# Patient Record
Sex: FEMALE | Race: ASIAN | NOT HISPANIC OR LATINO | Employment: FULL TIME | URBAN - METROPOLITAN AREA
[De-identification: names, ages, dates, MRNs, and addresses within clinical notes are randomized per-mention and may not be internally consistent; named-entity substitution may affect disease eponyms.]

---

## 2017-01-10 ENCOUNTER — PRENATAL OFFICE VISIT - HEALTHEAST (OUTPATIENT)
Dept: MIDWIFE SERVICES | Facility: CLINIC | Age: 32
End: 2017-01-10

## 2017-01-10 ENCOUNTER — COMMUNICATION - HEALTHEAST (OUTPATIENT)
Dept: ADMINISTRATIVE | Facility: CLINIC | Age: 32
End: 2017-01-10

## 2017-01-10 DIAGNOSIS — Z34.82 ENCOUNTER FOR SUPERVISION OF OTHER NORMAL PREGNANCY, SECOND TRIMESTER: ICD-10-CM

## 2017-01-10 DIAGNOSIS — N92.6 MISSED MENSES: ICD-10-CM

## 2017-01-10 DIAGNOSIS — O21.9 NAUSEA/VOMITING IN PREGNANCY: ICD-10-CM

## 2017-01-10 DIAGNOSIS — Z86.32 HISTORY OF GESTATIONAL DIABETES IN PRIOR PREGNANCY, CURRENTLY PREGNANT, FIRST TRIMESTER: ICD-10-CM

## 2017-01-10 DIAGNOSIS — O09.291 HISTORY OF GESTATIONAL DIABETES IN PRIOR PREGNANCY, CURRENTLY PREGNANT, FIRST TRIMESTER: ICD-10-CM

## 2017-01-10 ASSESSMENT — MIFFLIN-ST. JEOR: SCORE: 1240.19

## 2017-01-19 ENCOUNTER — RECORDS - HEALTHEAST (OUTPATIENT)
Dept: ADMINISTRATIVE | Facility: OTHER | Age: 32
End: 2017-01-19

## 2017-01-23 ENCOUNTER — AMBULATORY - HEALTHEAST (OUTPATIENT)
Dept: MIDWIFE SERVICES | Facility: CLINIC | Age: 32
End: 2017-01-23

## 2017-02-06 ENCOUNTER — AMBULATORY - HEALTHEAST (OUTPATIENT)
Dept: LAB | Facility: CLINIC | Age: 32
End: 2017-02-06

## 2017-02-06 DIAGNOSIS — O09.291 HISTORY OF GESTATIONAL DIABETES IN PRIOR PREGNANCY, CURRENTLY PREGNANT, FIRST TRIMESTER: ICD-10-CM

## 2017-02-06 DIAGNOSIS — Z34.82 ENCOUNTER FOR SUPERVISION OF OTHER NORMAL PREGNANCY, SECOND TRIMESTER: ICD-10-CM

## 2017-02-06 DIAGNOSIS — Z86.32 HISTORY OF GESTATIONAL DIABETES IN PRIOR PREGNANCY, CURRENTLY PREGNANT, FIRST TRIMESTER: ICD-10-CM

## 2017-02-07 ENCOUNTER — PRENATAL OFFICE VISIT - HEALTHEAST (OUTPATIENT)
Dept: MIDWIFE SERVICES | Facility: CLINIC | Age: 32
End: 2017-02-07

## 2017-02-07 DIAGNOSIS — O99.810 ABNORMAL MATERNAL GLUCOSE TOLERANCE, ANTEPARTUM: ICD-10-CM

## 2017-02-07 DIAGNOSIS — Z34.82 ENCOUNTER FOR SUPERVISION OF OTHER NORMAL PREGNANCY, SECOND TRIMESTER: ICD-10-CM

## 2017-02-07 LAB — SYPHILIS RPR SCREEN - HISTORICAL: NORMAL

## 2017-02-07 ASSESSMENT — MIFFLIN-ST. JEOR: SCORE: 1260.61

## 2017-02-21 ENCOUNTER — PRENATAL OFFICE VISIT - HEALTHEAST (OUTPATIENT)
Dept: MIDWIFE SERVICES | Facility: CLINIC | Age: 32
End: 2017-02-21

## 2017-02-21 DIAGNOSIS — Z23 NEED FOR TDAP VACCINATION: ICD-10-CM

## 2017-02-21 DIAGNOSIS — Z34.83 ENCOUNTER FOR SUPERVISION OF OTHER NORMAL PREGNANCY, THIRD TRIMESTER: ICD-10-CM

## 2017-02-21 ASSESSMENT — MIFFLIN-ST. JEOR: SCORE: 1265.14

## 2017-03-07 ENCOUNTER — PRENATAL OFFICE VISIT - HEALTHEAST (OUTPATIENT)
Dept: MIDWIFE SERVICES | Facility: CLINIC | Age: 32
End: 2017-03-07

## 2017-03-07 DIAGNOSIS — G56.00 PREGNANCY RELATED CARPAL TUNNEL SYNDROME, ANTEPARTUM: ICD-10-CM

## 2017-03-07 DIAGNOSIS — Z34.83 ENCOUNTER FOR SUPERVISION OF OTHER NORMAL PREGNANCY, THIRD TRIMESTER: ICD-10-CM

## 2017-03-07 DIAGNOSIS — N92.6 MISSED MENSES: ICD-10-CM

## 2017-03-07 DIAGNOSIS — O99.350 PREGNANCY RELATED CARPAL TUNNEL SYNDROME, ANTEPARTUM: ICD-10-CM

## 2017-03-07 ASSESSMENT — MIFFLIN-ST. JEOR: SCORE: 1274.21

## 2017-03-14 ENCOUNTER — OFFICE VISIT - HEALTHEAST (OUTPATIENT)
Dept: FAMILY MEDICINE | Facility: CLINIC | Age: 32
End: 2017-03-14

## 2017-03-14 DIAGNOSIS — J32.9 SINUSITIS: ICD-10-CM

## 2017-03-14 DIAGNOSIS — J02.9 SORETHROAT: ICD-10-CM

## 2017-03-15 ENCOUNTER — COMMUNICATION - HEALTHEAST (OUTPATIENT)
Dept: FAMILY MEDICINE | Facility: CLINIC | Age: 32
End: 2017-03-15

## 2017-03-21 ENCOUNTER — PRENATAL OFFICE VISIT - HEALTHEAST (OUTPATIENT)
Dept: MIDWIFE SERVICES | Facility: CLINIC | Age: 32
End: 2017-03-21

## 2017-03-21 DIAGNOSIS — Z60.3 LANGUAGE BARRIER: ICD-10-CM

## 2017-03-21 DIAGNOSIS — G56.00 PREGNANCY RELATED CARPAL TUNNEL SYNDROME, ANTEPARTUM: ICD-10-CM

## 2017-03-21 DIAGNOSIS — O99.350 PREGNANCY RELATED CARPAL TUNNEL SYNDROME, ANTEPARTUM: ICD-10-CM

## 2017-03-21 DIAGNOSIS — Z34.83 ENCOUNTER FOR SUPERVISION OF OTHER NORMAL PREGNANCY, THIRD TRIMESTER: ICD-10-CM

## 2017-03-21 DIAGNOSIS — Z75.8 LANGUAGE BARRIER: ICD-10-CM

## 2017-03-21 SDOH — SOCIAL STABILITY - SOCIAL INSECURITY: ACCULTURATION DIFFICULTY: Z60.3

## 2017-03-21 ASSESSMENT — MIFFLIN-ST. JEOR: SCORE: 1278.75

## 2017-04-04 ENCOUNTER — PRENATAL OFFICE VISIT - HEALTHEAST (OUTPATIENT)
Dept: MIDWIFE SERVICES | Facility: CLINIC | Age: 32
End: 2017-04-04

## 2017-04-04 DIAGNOSIS — N92.6 MISSED MENSES: ICD-10-CM

## 2017-04-04 DIAGNOSIS — Z34.83 ENCOUNTER FOR SUPERVISION OF OTHER NORMAL PREGNANCY, THIRD TRIMESTER: ICD-10-CM

## 2017-04-04 ASSESSMENT — MIFFLIN-ST. JEOR: SCORE: 1287.37

## 2017-04-11 ENCOUNTER — PRENATAL OFFICE VISIT - HEALTHEAST (OUTPATIENT)
Dept: MIDWIFE SERVICES | Facility: CLINIC | Age: 32
End: 2017-04-11

## 2017-04-11 DIAGNOSIS — Z34.83 ENCOUNTER FOR SUPERVISION OF OTHER NORMAL PREGNANCY, THIRD TRIMESTER: ICD-10-CM

## 2017-04-11 ASSESSMENT — MIFFLIN-ST. JEOR: SCORE: 1278.75

## 2017-04-18 ENCOUNTER — PRENATAL OFFICE VISIT - HEALTHEAST (OUTPATIENT)
Dept: MIDWIFE SERVICES | Facility: CLINIC | Age: 32
End: 2017-04-18

## 2017-04-18 ENCOUNTER — HOSPITAL ENCOUNTER (OUTPATIENT)
Dept: MEDSURG UNIT | Facility: CLINIC | Age: 32
Discharge: HOME | End: 2017-04-18
Attending: ADVANCED PRACTICE MIDWIFE | Admitting: ADVANCED PRACTICE MIDWIFE

## 2017-04-18 DIAGNOSIS — Z34.83 ENCOUNTER FOR SUPERVISION OF OTHER NORMAL PREGNANCY, THIRD TRIMESTER: ICD-10-CM

## 2017-04-18 DIAGNOSIS — N92.6 MISSED MENSES: ICD-10-CM

## 2017-04-18 ASSESSMENT — MIFFLIN-ST. JEOR: SCORE: 1282.83

## 2017-04-25 ENCOUNTER — PRENATAL OFFICE VISIT - HEALTHEAST (OUTPATIENT)
Dept: MIDWIFE SERVICES | Facility: CLINIC | Age: 32
End: 2017-04-25

## 2017-04-25 DIAGNOSIS — Z34.83 ENCOUNTER FOR SUPERVISION OF OTHER NORMAL PREGNANCY, THIRD TRIMESTER: ICD-10-CM

## 2017-04-25 ASSESSMENT — MIFFLIN-ST. JEOR: SCORE: 1287.82

## 2017-05-02 ENCOUNTER — PRENATAL OFFICE VISIT - HEALTHEAST (OUTPATIENT)
Dept: MIDWIFE SERVICES | Facility: CLINIC | Age: 32
End: 2017-05-02

## 2017-05-02 DIAGNOSIS — Z34.83 ENCOUNTER FOR SUPERVISION OF OTHER NORMAL PREGNANCY, THIRD TRIMESTER: ICD-10-CM

## 2017-05-02 ASSESSMENT — MIFFLIN-ST. JEOR: SCORE: 1296.89

## 2017-05-08 ENCOUNTER — AMBULATORY - HEALTHEAST (OUTPATIENT)
Dept: OBGYN | Facility: CLINIC | Age: 32
End: 2017-05-08

## 2017-05-08 ENCOUNTER — HOSPITAL ENCOUNTER (OUTPATIENT)
Dept: MEDSURG UNIT | Facility: CLINIC | Age: 32
Discharge: HOME | End: 2017-05-08
Attending: ADVANCED PRACTICE MIDWIFE | Admitting: ADVANCED PRACTICE MIDWIFE

## 2017-05-11 ENCOUNTER — HOME CARE/HOSPICE - HEALTHEAST (OUTPATIENT)
Dept: HOME HEALTH SERVICES | Facility: HOME HEALTH | Age: 32
End: 2017-05-11

## 2017-05-13 ENCOUNTER — HOME CARE/HOSPICE - HEALTHEAST (OUTPATIENT)
Dept: HOME HEALTH SERVICES | Facility: HOME HEALTH | Age: 32
End: 2017-05-13

## 2017-06-20 ENCOUNTER — OFFICE VISIT - HEALTHEAST (OUTPATIENT)
Dept: MIDWIFE SERVICES | Facility: CLINIC | Age: 32
End: 2017-06-20

## 2017-06-20 DIAGNOSIS — K59.00 CONSTIPATION: ICD-10-CM

## 2017-06-20 DIAGNOSIS — Z30.09 ENCOUNTER FOR OTHER GENERAL COUNSELING OR ADVICE ON CONTRACEPTION: ICD-10-CM

## 2017-06-20 DIAGNOSIS — N92.6 MISSED MENSES: ICD-10-CM

## 2017-06-20 DIAGNOSIS — Z34.83 ENCOUNTER FOR SUPERVISION OF OTHER NORMAL PREGNANCY, THIRD TRIMESTER: ICD-10-CM

## 2017-06-20 ASSESSMENT — MIFFLIN-ST. JEOR: SCORE: 1185.76

## 2017-07-25 ENCOUNTER — HOSPITAL ENCOUNTER (OUTPATIENT)
Dept: ULTRASOUND IMAGING | Facility: CLINIC | Age: 32
Discharge: HOME | End: 2017-07-25
Attending: ADVANCED PRACTICE MIDWIFE

## 2017-07-25 ENCOUNTER — OFFICE VISIT - HEALTHEAST (OUTPATIENT)
Dept: MIDWIFE SERVICES | Facility: CLINIC | Age: 32
End: 2017-07-25

## 2017-07-25 DIAGNOSIS — N93.9 VAGINAL BLEEDING, ABNORMAL: ICD-10-CM

## 2017-07-25 DIAGNOSIS — R10.2 PERINEAL PAIN IN FEMALE: ICD-10-CM

## 2017-07-25 ASSESSMENT — MIFFLIN-ST. JEOR: SCORE: 1178.96

## 2017-08-22 ENCOUNTER — OFFICE VISIT - HEALTHEAST (OUTPATIENT)
Dept: MIDWIFE SERVICES | Facility: CLINIC | Age: 32
End: 2017-08-22

## 2017-08-22 DIAGNOSIS — R10.2 PERINEAL PAIN IN FEMALE: ICD-10-CM

## 2017-08-22 DIAGNOSIS — K64.4 EXTERNAL HEMORRHOID: ICD-10-CM

## 2017-08-22 ASSESSMENT — MIFFLIN-ST. JEOR: SCORE: 1178.96

## 2017-09-12 ENCOUNTER — OFFICE VISIT - HEALTHEAST (OUTPATIENT)
Dept: MIDWIFE SERVICES | Facility: CLINIC | Age: 32
End: 2017-09-12

## 2017-09-12 DIAGNOSIS — Z30.42 ENCOUNTER FOR DEPO-PROVERA CONTRACEPTION: ICD-10-CM

## 2017-10-08 ENCOUNTER — RECORDS - HEALTHEAST (OUTPATIENT)
Dept: ADMINISTRATIVE | Facility: OTHER | Age: 32
End: 2017-10-08

## 2017-10-30 ENCOUNTER — AMBULATORY - HEALTHEAST (OUTPATIENT)
Dept: MIDWIFE SERVICES | Facility: CLINIC | Age: 32
End: 2017-10-30

## 2017-11-12 ENCOUNTER — OFFICE VISIT - HEALTHEAST (OUTPATIENT)
Dept: FAMILY MEDICINE | Facility: CLINIC | Age: 32
End: 2017-11-12

## 2017-11-12 DIAGNOSIS — R07.0 THROAT PAIN: ICD-10-CM

## 2017-11-12 DIAGNOSIS — R05.9 COUGH: ICD-10-CM

## 2017-11-12 DIAGNOSIS — R50.9 FEVER: ICD-10-CM

## 2017-11-27 ENCOUNTER — OFFICE VISIT - HEALTHEAST (OUTPATIENT)
Dept: FAMILY MEDICINE | Facility: CLINIC | Age: 32
End: 2017-11-27

## 2017-11-27 DIAGNOSIS — R51.9 HEADACHE: ICD-10-CM

## 2017-12-05 ENCOUNTER — OFFICE VISIT - HEALTHEAST (OUTPATIENT)
Dept: MIDWIFE SERVICES | Facility: CLINIC | Age: 32
End: 2017-12-05

## 2017-12-05 DIAGNOSIS — Z30.42 ON DEPO-PROVERA FOR CONTRACEPTION: ICD-10-CM

## 2017-12-05 DIAGNOSIS — N92.0 MENORRHAGIA: ICD-10-CM

## 2017-12-05 DIAGNOSIS — Z30.42 SURVEILLANCE FOR DEPO-PROVERA CONTRACEPTION: ICD-10-CM

## 2017-12-05 ASSESSMENT — MIFFLIN-ST. JEOR: SCORE: 1181.23

## 2017-12-19 ENCOUNTER — AMBULATORY - HEALTHEAST (OUTPATIENT)
Dept: PHYSICAL MEDICINE AND REHAB | Facility: CLINIC | Age: 32
End: 2017-12-19

## 2017-12-19 ENCOUNTER — OFFICE VISIT - HEALTHEAST (OUTPATIENT)
Dept: FAMILY MEDICINE | Facility: CLINIC | Age: 32
End: 2017-12-19

## 2017-12-19 DIAGNOSIS — R20.2 NUMBNESS AND TINGLING OF RIGHT HAND: ICD-10-CM

## 2017-12-19 DIAGNOSIS — R20.2 PARESTHESIA OF HAND: ICD-10-CM

## 2017-12-19 DIAGNOSIS — R20.0 NUMBNESS AND TINGLING OF RIGHT HAND: ICD-10-CM

## 2017-12-19 LAB — HBA1C MFR BLD: 5.2 % (ref 3.5–6.1)

## 2017-12-20 ENCOUNTER — COMMUNICATION - HEALTHEAST (OUTPATIENT)
Dept: FAMILY MEDICINE | Facility: CLINIC | Age: 32
End: 2017-12-20

## 2018-01-17 ENCOUNTER — HOSPITAL ENCOUNTER (OUTPATIENT)
Dept: PHYSICAL MEDICINE AND REHAB | Facility: CLINIC | Age: 33
Discharge: HOME | End: 2018-01-17
Attending: PHYSICAL MEDICINE & REHABILITATION

## 2018-01-17 DIAGNOSIS — R20.2 RIGHT HAND PARESTHESIA: ICD-10-CM

## 2018-02-05 ENCOUNTER — COMMUNICATION - HEALTHEAST (OUTPATIENT)
Dept: FAMILY MEDICINE | Facility: CLINIC | Age: 33
End: 2018-02-05

## 2018-02-13 ENCOUNTER — OFFICE VISIT - HEALTHEAST (OUTPATIENT)
Dept: FAMILY MEDICINE | Facility: CLINIC | Age: 33
End: 2018-02-13

## 2018-02-13 DIAGNOSIS — G56.01 CARPAL TUNNEL SYNDROME ON RIGHT: ICD-10-CM

## 2018-02-20 ENCOUNTER — RECORDS - HEALTHEAST (OUTPATIENT)
Dept: ADMINISTRATIVE | Facility: OTHER | Age: 33
End: 2018-02-20

## 2018-02-26 ENCOUNTER — OFFICE VISIT - HEALTHEAST (OUTPATIENT)
Dept: MIDWIFE SERVICES | Facility: CLINIC | Age: 33
End: 2018-02-26

## 2018-02-26 DIAGNOSIS — Z30.9 CONTRACEPTIVE MANAGEMENT: ICD-10-CM

## 2018-05-21 ENCOUNTER — OFFICE VISIT - HEALTHEAST (OUTPATIENT)
Dept: MIDWIFE SERVICES | Facility: CLINIC | Age: 33
End: 2018-05-21

## 2018-05-21 DIAGNOSIS — Z30.09 ENCOUNTER FOR OTHER GENERAL COUNSELING OR ADVICE ON CONTRACEPTION: ICD-10-CM

## 2018-05-22 ENCOUNTER — RECORDS - HEALTHEAST (OUTPATIENT)
Dept: ADMINISTRATIVE | Facility: OTHER | Age: 33
End: 2018-05-22

## 2018-08-06 ENCOUNTER — OFFICE VISIT - HEALTHEAST (OUTPATIENT)
Dept: MIDWIFE SERVICES | Facility: CLINIC | Age: 33
End: 2018-08-06

## 2018-08-06 DIAGNOSIS — Z30.013 ENCOUNTER FOR INITIAL PRESCRIPTION OF INJECTABLE CONTRACEPTIVE: ICD-10-CM

## 2018-10-01 ENCOUNTER — OFFICE VISIT - HEALTHEAST (OUTPATIENT)
Dept: MIDWIFE SERVICES | Facility: CLINIC | Age: 33
End: 2018-10-01

## 2018-10-01 DIAGNOSIS — Z30.09 CONTRACEPTIVE EDUCATION: ICD-10-CM

## 2018-10-01 ASSESSMENT — MIFFLIN-ST. JEOR: SCORE: 1162.18

## 2019-01-28 ENCOUNTER — OFFICE VISIT - HEALTHEAST (OUTPATIENT)
Dept: FAMILY MEDICINE | Facility: CLINIC | Age: 34
End: 2019-01-28

## 2019-01-28 DIAGNOSIS — Z00.00 ROUTINE GENERAL MEDICAL EXAMINATION AT A HEALTH CARE FACILITY: ICD-10-CM

## 2019-01-28 DIAGNOSIS — Z30.9 ENCOUNTER FOR CONTRACEPTIVE MANAGEMENT, UNSPECIFIED TYPE: ICD-10-CM

## 2019-01-28 DIAGNOSIS — M25.511 CHRONIC PAIN OF BOTH SHOULDERS: ICD-10-CM

## 2019-01-28 DIAGNOSIS — G89.29 CHRONIC PAIN OF BOTH SHOULDERS: ICD-10-CM

## 2019-01-28 DIAGNOSIS — M25.512 CHRONIC PAIN OF BOTH SHOULDERS: ICD-10-CM

## 2019-01-28 DIAGNOSIS — M54.2 NECK PAIN: ICD-10-CM

## 2019-01-28 ASSESSMENT — MIFFLIN-ST. JEOR: SCORE: 1199.09

## 2019-02-14 ENCOUNTER — OFFICE VISIT - HEALTHEAST (OUTPATIENT)
Dept: PHYSICAL THERAPY | Facility: REHABILITATION | Age: 34
End: 2019-02-14

## 2019-02-14 DIAGNOSIS — G89.29 CHRONIC PAIN OF BOTH SHOULDERS: ICD-10-CM

## 2019-02-14 DIAGNOSIS — R29.898 DECREASED RANGE OF MOTION OF NECK: ICD-10-CM

## 2019-02-14 DIAGNOSIS — M54.2 CHRONIC NECK PAIN: ICD-10-CM

## 2019-02-14 DIAGNOSIS — M25.511 CHRONIC PAIN OF BOTH SHOULDERS: ICD-10-CM

## 2019-02-14 DIAGNOSIS — M25.512 CHRONIC PAIN OF BOTH SHOULDERS: ICD-10-CM

## 2019-02-14 DIAGNOSIS — G89.29 CHRONIC NECK PAIN: ICD-10-CM

## 2019-02-14 DIAGNOSIS — M25.619 DECREASED RANGE OF MOTION OF SHOULDER, UNSPECIFIED LATERALITY: ICD-10-CM

## 2019-02-14 DIAGNOSIS — M62.81 GENERALIZED MUSCLE WEAKNESS: ICD-10-CM

## 2019-02-14 DIAGNOSIS — R20.0 BILATERAL HAND NUMBNESS: ICD-10-CM

## 2019-02-18 ENCOUNTER — OFFICE VISIT - HEALTHEAST (OUTPATIENT)
Dept: FAMILY MEDICINE | Facility: CLINIC | Age: 34
End: 2019-02-18

## 2019-02-18 DIAGNOSIS — Z30.017 NEXPLANON INSERTION: ICD-10-CM

## 2019-02-18 LAB
HCG UR QL: NEGATIVE
SP GR UR STRIP: 1.01 (ref 1–1.03)

## 2019-02-21 ENCOUNTER — OFFICE VISIT - HEALTHEAST (OUTPATIENT)
Dept: PHYSICAL THERAPY | Facility: REHABILITATION | Age: 34
End: 2019-02-21

## 2019-02-21 DIAGNOSIS — M25.511 CHRONIC PAIN OF BOTH SHOULDERS: ICD-10-CM

## 2019-02-21 DIAGNOSIS — M62.81 GENERALIZED MUSCLE WEAKNESS: ICD-10-CM

## 2019-02-21 DIAGNOSIS — M54.2 CHRONIC NECK PAIN: ICD-10-CM

## 2019-02-21 DIAGNOSIS — R29.898 DECREASED RANGE OF MOTION OF NECK: ICD-10-CM

## 2019-02-21 DIAGNOSIS — M25.619 DECREASED RANGE OF MOTION OF SHOULDER, UNSPECIFIED LATERALITY: ICD-10-CM

## 2019-02-21 DIAGNOSIS — R20.0 BILATERAL HAND NUMBNESS: ICD-10-CM

## 2019-02-21 DIAGNOSIS — M25.512 CHRONIC PAIN OF BOTH SHOULDERS: ICD-10-CM

## 2019-02-21 DIAGNOSIS — G89.29 CHRONIC NECK PAIN: ICD-10-CM

## 2019-02-21 DIAGNOSIS — G89.29 CHRONIC PAIN OF BOTH SHOULDERS: ICD-10-CM

## 2019-02-28 ENCOUNTER — OFFICE VISIT - HEALTHEAST (OUTPATIENT)
Dept: PHYSICAL THERAPY | Facility: REHABILITATION | Age: 34
End: 2019-02-28

## 2019-02-28 DIAGNOSIS — M54.2 CHRONIC NECK PAIN: ICD-10-CM

## 2019-02-28 DIAGNOSIS — M62.81 GENERALIZED MUSCLE WEAKNESS: ICD-10-CM

## 2019-02-28 DIAGNOSIS — M25.512 CHRONIC PAIN OF BOTH SHOULDERS: ICD-10-CM

## 2019-02-28 DIAGNOSIS — G89.29 CHRONIC NECK PAIN: ICD-10-CM

## 2019-02-28 DIAGNOSIS — G89.29 CHRONIC PAIN OF BOTH SHOULDERS: ICD-10-CM

## 2019-02-28 DIAGNOSIS — R29.898 DECREASED RANGE OF MOTION OF NECK: ICD-10-CM

## 2019-02-28 DIAGNOSIS — M25.511 CHRONIC PAIN OF BOTH SHOULDERS: ICD-10-CM

## 2019-02-28 DIAGNOSIS — R20.0 BILATERAL HAND NUMBNESS: ICD-10-CM

## 2019-02-28 DIAGNOSIS — M25.619 DECREASED RANGE OF MOTION OF SHOULDER, UNSPECIFIED LATERALITY: ICD-10-CM

## 2019-03-07 ENCOUNTER — OFFICE VISIT - HEALTHEAST (OUTPATIENT)
Dept: PHYSICAL THERAPY | Facility: REHABILITATION | Age: 34
End: 2019-03-07

## 2019-03-07 DIAGNOSIS — R29.898 DECREASED RANGE OF MOTION OF NECK: ICD-10-CM

## 2019-03-07 DIAGNOSIS — M62.81 GENERALIZED MUSCLE WEAKNESS: ICD-10-CM

## 2019-03-07 DIAGNOSIS — R20.0 BILATERAL HAND NUMBNESS: ICD-10-CM

## 2019-03-07 DIAGNOSIS — M54.2 CHRONIC NECK PAIN: ICD-10-CM

## 2019-03-07 DIAGNOSIS — G89.29 CHRONIC PAIN OF BOTH SHOULDERS: ICD-10-CM

## 2019-03-07 DIAGNOSIS — M25.512 CHRONIC PAIN OF BOTH SHOULDERS: ICD-10-CM

## 2019-03-07 DIAGNOSIS — M25.511 CHRONIC PAIN OF BOTH SHOULDERS: ICD-10-CM

## 2019-03-07 DIAGNOSIS — M25.619 DECREASED RANGE OF MOTION OF SHOULDER, UNSPECIFIED LATERALITY: ICD-10-CM

## 2019-03-07 DIAGNOSIS — G89.29 CHRONIC NECK PAIN: ICD-10-CM

## 2019-03-14 ENCOUNTER — OFFICE VISIT - HEALTHEAST (OUTPATIENT)
Dept: PHYSICAL THERAPY | Facility: REHABILITATION | Age: 34
End: 2019-03-14

## 2019-03-14 DIAGNOSIS — R20.0 BILATERAL HAND NUMBNESS: ICD-10-CM

## 2019-03-14 DIAGNOSIS — M54.2 CHRONIC NECK PAIN: ICD-10-CM

## 2019-03-14 DIAGNOSIS — R29.898 DECREASED RANGE OF MOTION OF NECK: ICD-10-CM

## 2019-03-14 DIAGNOSIS — M25.619 DECREASED RANGE OF MOTION OF SHOULDER, UNSPECIFIED LATERALITY: ICD-10-CM

## 2019-03-14 DIAGNOSIS — G89.29 CHRONIC NECK PAIN: ICD-10-CM

## 2019-03-14 DIAGNOSIS — M62.81 GENERALIZED MUSCLE WEAKNESS: ICD-10-CM

## 2019-03-14 DIAGNOSIS — G89.29 CHRONIC PAIN OF BOTH SHOULDERS: ICD-10-CM

## 2019-03-14 DIAGNOSIS — M25.512 CHRONIC PAIN OF BOTH SHOULDERS: ICD-10-CM

## 2019-03-14 DIAGNOSIS — M25.511 CHRONIC PAIN OF BOTH SHOULDERS: ICD-10-CM

## 2019-03-28 ENCOUNTER — OFFICE VISIT - HEALTHEAST (OUTPATIENT)
Dept: PHYSICAL THERAPY | Facility: REHABILITATION | Age: 34
End: 2019-03-28

## 2019-03-28 DIAGNOSIS — G89.29 CHRONIC PAIN OF BOTH SHOULDERS: ICD-10-CM

## 2019-03-28 DIAGNOSIS — M62.81 GENERALIZED MUSCLE WEAKNESS: ICD-10-CM

## 2019-03-28 DIAGNOSIS — M54.2 CHRONIC NECK PAIN: ICD-10-CM

## 2019-03-28 DIAGNOSIS — R20.0 BILATERAL HAND NUMBNESS: ICD-10-CM

## 2019-03-28 DIAGNOSIS — R29.898 DECREASED RANGE OF MOTION OF NECK: ICD-10-CM

## 2019-03-28 DIAGNOSIS — G89.29 CHRONIC NECK PAIN: ICD-10-CM

## 2019-03-28 DIAGNOSIS — M25.619 DECREASED RANGE OF MOTION OF SHOULDER, UNSPECIFIED LATERALITY: ICD-10-CM

## 2019-03-28 DIAGNOSIS — M25.512 CHRONIC PAIN OF BOTH SHOULDERS: ICD-10-CM

## 2019-03-28 DIAGNOSIS — M25.511 CHRONIC PAIN OF BOTH SHOULDERS: ICD-10-CM

## 2019-04-18 ENCOUNTER — OFFICE VISIT - HEALTHEAST (OUTPATIENT)
Dept: PHYSICAL THERAPY | Facility: REHABILITATION | Age: 34
End: 2019-04-18

## 2019-04-18 DIAGNOSIS — R20.0 BILATERAL HAND NUMBNESS: ICD-10-CM

## 2019-04-18 DIAGNOSIS — G89.29 CHRONIC NECK PAIN: ICD-10-CM

## 2019-04-18 DIAGNOSIS — G89.29 CHRONIC PAIN OF BOTH SHOULDERS: ICD-10-CM

## 2019-04-18 DIAGNOSIS — M54.2 CHRONIC NECK PAIN: ICD-10-CM

## 2019-04-18 DIAGNOSIS — M25.511 CHRONIC PAIN OF BOTH SHOULDERS: ICD-10-CM

## 2019-04-18 DIAGNOSIS — M62.81 GENERALIZED MUSCLE WEAKNESS: ICD-10-CM

## 2019-04-18 DIAGNOSIS — R29.898 DECREASED RANGE OF MOTION OF NECK: ICD-10-CM

## 2019-04-18 DIAGNOSIS — M25.512 CHRONIC PAIN OF BOTH SHOULDERS: ICD-10-CM

## 2019-04-18 DIAGNOSIS — M25.619 DECREASED RANGE OF MOTION OF SHOULDER, UNSPECIFIED LATERALITY: ICD-10-CM

## 2020-01-29 ENCOUNTER — COMMUNICATION - HEALTHEAST (OUTPATIENT)
Dept: FAMILY MEDICINE | Facility: CLINIC | Age: 35
End: 2020-01-29

## 2020-12-21 ENCOUNTER — OFFICE VISIT - HEALTHEAST (OUTPATIENT)
Dept: FAMILY MEDICINE | Facility: CLINIC | Age: 35
End: 2020-12-21

## 2020-12-21 DIAGNOSIS — H66.90 ACUTE OTITIS MEDIA, UNSPECIFIED OTITIS MEDIA TYPE: ICD-10-CM

## 2021-01-13 ENCOUNTER — OFFICE VISIT - HEALTHEAST (OUTPATIENT)
Dept: FAMILY MEDICINE | Facility: CLINIC | Age: 36
End: 2021-01-13

## 2021-01-13 DIAGNOSIS — J01.00 ACUTE NON-RECURRENT MAXILLARY SINUSITIS: ICD-10-CM

## 2021-03-16 ENCOUNTER — OFFICE VISIT - HEALTHEAST (OUTPATIENT)
Dept: FAMILY MEDICINE | Facility: CLINIC | Age: 36
End: 2021-03-16

## 2021-03-16 DIAGNOSIS — Z00.00 ROUTINE GENERAL MEDICAL EXAMINATION AT A HEALTH CARE FACILITY: ICD-10-CM

## 2021-03-16 DIAGNOSIS — R68.89 COLD INTOLERANCE: ICD-10-CM

## 2021-03-16 LAB
CHOLEST SERPL-MCNC: 158 MG/DL
ERYTHROCYTE [DISTWIDTH] IN BLOOD BY AUTOMATED COUNT: 12.6 % (ref 11–14.5)
FASTING STATUS PATIENT QL REPORTED: YES
FASTING STATUS PATIENT QL REPORTED: YES
GLUCOSE BLD-MCNC: 93 MG/DL (ref 70–99)
HCT VFR BLD AUTO: 44.4 % (ref 35–47)
HDLC SERPL-MCNC: 45 MG/DL
HGB BLD-MCNC: 15 G/DL (ref 12–16)
LDLC SERPL CALC-MCNC: 101 MG/DL
MCH RBC QN AUTO: 29.7 PG (ref 27–34)
MCHC RBC AUTO-ENTMCNC: 33.8 G/DL (ref 32–36)
MCV RBC AUTO: 88 FL (ref 80–100)
PLATELET # BLD AUTO: 203 THOU/UL (ref 140–440)
PMV BLD AUTO: 11.6 FL (ref 7–10)
RBC # BLD AUTO: 5.05 MILL/UL (ref 3.8–5.4)
T3FREE SERPL-MCNC: 3.2 PG/ML (ref 1.9–3.9)
T4 FREE SERPL-MCNC: 1 NG/DL (ref 0.7–1.8)
TRIGL SERPL-MCNC: 62 MG/DL
TSH SERPL DL<=0.005 MIU/L-ACNC: 1.03 UIU/ML (ref 0.3–5)
WBC: 7 THOU/UL (ref 4–11)

## 2021-03-16 RX ORDER — ETONOGESTREL 68 MG/1
1 IMPLANT SUBCUTANEOUS ONCE
Status: SHIPPED | COMMUNITY
Start: 2021-02-18 | End: 2022-01-10

## 2021-03-16 ASSESSMENT — MIFFLIN-ST. JEOR: SCORE: 1210.71

## 2021-03-17 ENCOUNTER — COMMUNICATION - HEALTHEAST (OUTPATIENT)
Dept: FAMILY MEDICINE | Facility: CLINIC | Age: 36
End: 2021-03-17

## 2021-05-27 NOTE — PROGRESS NOTES
[facility] Daily Progress     Patient Name: Dayana Mazariegos  Date: 2019  Visit #:   Referring Provider: Darya Torres*  Referring Diagnosis:   Neck pain [M54.2]       Chronic pain of both shoulders [M25.511, G89.29, M25.512]          Visit Diagnosis:     ICD-10-CM    1. Chronic neck pain M54.2     G89.29    2. Chronic pain of both shoulders M25.511     G89.29     M25.512    3. Generalized muscle weakness M62.81    4. Decreased range of motion of shoulder, unspecified laterality M25.619    5. Decreased range of motion of neck R29.898    6. Bilateral hand numbness R20.0        Assessment:     Pt reports neck and shoulders feel great with mild occasional B hand tingling remaining.    Patient is benefitting from skilled physical therapy and is making steady progress toward functional goals.  Patient is appropriate to continue with skilled physical therapy intervention, as indicated by initial plan of care.    Goal Status:  Pt. will demonstrate/verbalize independence in self-management of condition in : 12 weeks - MET    Pt. will have improved quality of sleep: with less pain;waking less times/night;in 12 weeks -MET    Patient Turn Head: for driving;for conversation;with less pain;with less difficulty;in 12 weeks - MET    Patient will reach / maintain arm movement: overhead;with less pain;with less difficulty;in 12 weeks - MET    Plan / Patient Education:     Pt demo's I with HEP and has met goals. Will D/C to I with HEP.  Thank you for this referral!   Pt to contact PT if any questions/concerns arise.    Subjective:     Pain Ratin/10 for neck pain today - pt reports neck pain and headaches are mostly gone but B hand numbness can remain.     Pt also reports that she hurt her back when she did upright rows with the green band so she D/C'ed that exercise.    Pt very pleased with her progress.    Objective:     ROM - B shoulder flexion 180   without pain (was 135 with pain )  Neck ROM WNL  without pain (was min to mod with pain)    Thoracic ROM WNL without back pain     Strength - B shoulder flexors 5/5 and abd 5/5 (was 3+/5 with pain)    FROM LAST VISIT  Neck strength  CCFT - pt can reach 4-12 mmHg change and hold x10 seconds x10 reps (unable to do this initially)    Treatment Today      TREATMENT MINUTES COMMENTS   Evaluation     Self-care/ Home management     Manual therapy     Neuromuscular Re-education     Therapeutic Activity     Therapeutic Exercises 26 Reassessed neck, thoracic and B shoulder ROM and strength and discussed results. Performed, added to and finalized HEP per pt instructions and printed for home.    Gait training     Modality__________________                Total 26    Blank areas are intentional and mean the treatment did not include these items.       Gissell Ruggiero PT, DPT, OCS, CLT  4/18/2019  11:37 AM

## 2021-05-27 NOTE — PROGRESS NOTES
[facility] Daily Progress     Patient Name: Dayana Mazariegos  Date: 3/28/2019  Visit #:   Referring Provider: Darya Torres*  Referring Diagnosis:   Neck pain [M54.2]       Chronic pain of both shoulders [M25.511, G89.29, M25.512]          Visit Diagnosis:     ICD-10-CM    1. Chronic neck pain M54.2     G89.29    2. Chronic pain of both shoulders M25.511     G89.29     M25.512    3. Generalized muscle weakness M62.81    4. Decreased range of motion of shoulder, unspecified laterality M25.619    5. Decreased range of motion of neck R29.898    6. Bilateral hand numbness R20.0        Assessment:     Pt reports minimal pain sx remaining and demo's great improvement to neck and B shoulder ROM and strength.    Patient is benefitting from skilled physical therapy and is making steady progress toward functional goals.  Patient is appropriate to continue with skilled physical therapy intervention, as indicated by initial plan of care.    Goal Status:  Pt. will demonstrate/verbalize independence in self-management of condition in : 12 weeks - IMPROVING    Pt. will have improved quality of sleep: with less pain;waking less times/night;in 12 weeks - IMPROVING    Patient Turn Head: for driving;for conversation;with less pain;with less difficulty;in 12 weeks - IMPROVING    Patient will reach / maintain arm movement: overhead;with less pain;with less difficulty;in 12 weeks - IMPROVING    Plan / Patient Education:     Continue with initial plan of care.  Reassess sx in 3 weeks - attempt counter push ups if able.    Subjective:     Pain Ratin/10 - feeling really good with HEP.   Pain can increase slightly after the exercises - like it worked hard - but feels better later.   Headaches are gone and pt is sleeping better.    Objective:     ROM - B shoulder flexion 180   without pain (was 135 with pain )  Neck ROM WNL without pain (was min to mod with pain)    Strength - B shoulder flexors 4+/5 and abd 5/5 (was  3+/5 with pain)    Neck strength  CCFT - pt can reach 4-12 mmHg change and hold x10 seconds x10 reps (unable to do this initially)    Treatment Today      TREATMENT MINUTES COMMENTS   Evaluation     Self-care/ Home management     Manual therapy     Neuromuscular Re-education     Therapeutic Activity     Therapeutic Exercises 24 Reassessed neck and B shoulder ROM and strength and discussed results. Performed and added to HEP per pt instructions and printed for home.    Gait training     Modality__________________                Total 24    Blank areas are intentional and mean the treatment did not include these items.       Gissell Ruggiero PT, DPT, OCS, CLT  3/28/2019  11:40 AM

## 2021-05-30 VITALS — HEIGHT: 62 IN | WEIGHT: 143 LBS | BODY MASS INDEX: 26.31 KG/M2

## 2021-05-30 VITALS — BODY MASS INDEX: 25.58 KG/M2 | WEIGHT: 139 LBS | HEIGHT: 62 IN

## 2021-05-30 VITALS — WEIGHT: 139 LBS | HEIGHT: 62 IN | BODY MASS INDEX: 25.58 KG/M2

## 2021-05-30 VITALS — WEIGHT: 140.9 LBS | BODY MASS INDEX: 25.93 KG/M2 | HEIGHT: 62 IN

## 2021-05-30 VITALS — BODY MASS INDEX: 25.95 KG/M2 | WEIGHT: 141 LBS | HEIGHT: 62 IN

## 2021-05-30 VITALS — BODY MASS INDEX: 24.84 KG/M2 | WEIGHT: 135 LBS | HEIGHT: 62 IN

## 2021-05-30 VITALS — HEIGHT: 62 IN | WEIGHT: 139.9 LBS | BODY MASS INDEX: 25.75 KG/M2

## 2021-05-30 VITALS — WEIGHT: 130.5 LBS | BODY MASS INDEX: 24.01 KG/M2 | HEIGHT: 62 IN

## 2021-05-30 VITALS — HEIGHT: 62 IN | BODY MASS INDEX: 25.4 KG/M2 | WEIGHT: 138 LBS

## 2021-05-30 VITALS — BODY MASS INDEX: 25.03 KG/M2 | WEIGHT: 136 LBS | HEIGHT: 62 IN

## 2021-05-30 VITALS — WEIGHT: 139.5 LBS | BODY MASS INDEX: 25.51 KG/M2

## 2021-05-31 VITALS — HEIGHT: 62 IN | BODY MASS INDEX: 21.53 KG/M2 | WEIGHT: 117 LBS

## 2021-05-31 VITALS — WEIGHT: 120.25 LBS | BODY MASS INDEX: 22.72 KG/M2

## 2021-05-31 VITALS — BODY MASS INDEX: 22.48 KG/M2 | WEIGHT: 119 LBS

## 2021-05-31 VITALS — HEIGHT: 61 IN | BODY MASS INDEX: 22.84 KG/M2 | WEIGHT: 121 LBS

## 2021-05-31 VITALS — BODY MASS INDEX: 22.76 KG/M2 | WEIGHT: 120.44 LBS

## 2021-05-31 VITALS — WEIGHT: 118.5 LBS | BODY MASS INDEX: 21.81 KG/M2 | HEIGHT: 62 IN

## 2021-06-01 VITALS — WEIGHT: 120.31 LBS | BODY MASS INDEX: 22.73 KG/M2

## 2021-06-02 VITALS — BODY MASS INDEX: 22.35 KG/M2 | HEIGHT: 62 IN | WEIGHT: 121.44 LBS

## 2021-06-02 VITALS — BODY MASS INDEX: 22.13 KG/M2 | WEIGHT: 121 LBS

## 2021-06-02 VITALS — HEIGHT: 61 IN | BODY MASS INDEX: 22.05 KG/M2 | WEIGHT: 116.8 LBS

## 2021-06-05 VITALS
DIASTOLIC BLOOD PRESSURE: 64 MMHG | SYSTOLIC BLOOD PRESSURE: 98 MMHG | BODY MASS INDEX: 22.82 KG/M2 | HEART RATE: 74 BPM | WEIGHT: 124 LBS | OXYGEN SATURATION: 96 % | HEIGHT: 62 IN

## 2021-06-05 NOTE — TELEPHONE ENCOUNTER
Spoke to  and left a message for patient to call back.  She may take 800mg with food three times a day for 10 days.  If this is not helping, then to call back

## 2021-06-05 NOTE — TELEPHONE ENCOUNTER
Patient presented to the  of the clinic and stated she currently has the Nexplanon and has been bleeding since before [place]. Patient declined on scheduling an appointment. Patient is requesting a medication be submitted to the pharmacy to stop the bleeding. Please advise.  Thank you, Jane Choudhury

## 2021-06-08 NOTE — PROGRESS NOTES
Dayana Mazariegos is here with  for a routine prenatal visit at 27w0d.  She has c/o cold symptoms x2 weeks with cough.  Getting better - doing warm honey drinks.  Reviewed and gave handouts on colds in pregnancy and safety of Cat B medications before Cat C.  No fevers, headaches, or green discharge.  Mild RUQ pain from coughing.  Discussed walk-in care if symptoms worsen, but supportive care appropriate at this time.  She is feeling fetal movement regularly.  Reviewed u/s from [facility] - no further f-up needed. Fetal maturity and expectations for fetal movement in the third trimester, how and when to do fetal kick counts and when to call CNM discussed. Appetite is normal. Interval weight gain is appropriate.  28 week labs (1 hour GTT, Hgb & RPR) done yesterday since Dayana did an early 1 hour GCT and failed and had to do the 3 hour early.  Passed 3 hour yesterday!  Very happy not to have GDM.  Risks and benefits of TdaP during pregnancy at 27-36 weeks discussed and desires at a future visit.   Advised scheduling visits every 2-3 weeks until ~36 weeks. Anticipatory guidance, warning signs (with emphasis on  labor signs and symptoms), when to call and CNM contact information reviewed.

## 2021-06-08 NOTE — PROGRESS NOTES
Dayana Mazariegos is here with professional  for a routine prenatal visit at 23w0d.  She has no concerns and is feeling well.  Has LII U/S scheduled with MPP (due to EIF of fetal heart) - was unsure of location.  Gave her MPP number for her and  to call and confirm together.  She is feeling fetal movement regularly.  S>D today - will have MPP U/S soon.  Appetite is normal - though c/o nausea in the mornings - still taking B6/Unisom. Interval weight gain is appropriate.  Discussed 28 week labs/screening for gestational diabetes, anemia and RPR are recommended at her next visit.  Discussed doing 3 hour GTT since patient did early 1 hour GCT and did not pass.  Patient agrees.  So, 3 hour GTT, RPR and Hgb ordered as future - instructed on scheduling. Handouts given on glucose testing and TdaP during pregnancy.  Anticipatory guidance, warning signs, when to call and CNM contact information reviewed.

## 2021-06-09 NOTE — PROGRESS NOTES
Dayana here today with professional  for routine prenatal visit. Baby very active.Having leg cramps in right calf at night, discussed comfort measures and OTC supplements that may help to alleviate symptoms. Received Tdap vaccine today.

## 2021-06-09 NOTE — PROGRESS NOTES
Dayana is here with .  Active baby.  Wants Rx for Calcium to relieve leg cramps - would rather have Rx.  She is also noticing right wrist/hand pain - worse at night, sometimes she drops things.  Discussed carpal tunnel in pregnancy - reassured of normalcy, discussed ways to alleviate.  Also offered OT consult to advise for wrist splints.  Patient declines at this time.  Does not plan circ - other boys are not circumcised.  She is still hoping this provider will be at her birth.  Reviewed the group practice and call shifts for safety for her and myself.  Did note that my partners can call me, but I cannot guarantee that I will be at her birth.  Explained that all of the CNMs in the practice are very kind and caring and will take great care of her in labor.

## 2021-06-09 NOTE — PROGRESS NOTES
Dayana Mazariegos is here with  for a routine prenatal visit at 35w0d.  She has c/o feeling uncomfortable when she walks - more pressure and active baby - discussed normal sensations at end of pregnancy.    Fetal movement is normal. Interval weight gain is approriate.  Discussed how to obtain a breast pump and she will check with insurance.  EPDS = 0 today.  No concerns about PPD; identifies a good support system.  Reviewed Group B strep vaginal & rectal culture and hemoglobin at one of her upcoming visits between 35-37 weeks. Encouraged to schedule weekly visits from ~36 weeks to/through her EDB.   Anticipatory guidance, warning signs (with emphasis on  labor signs and symptoms), when to call and CNM contact information reviewed.  Needs help with pre-registration - will do at next visit.

## 2021-06-09 NOTE — PROGRESS NOTES
31-year-old female who is 32 weeks gestation presents with acute sinusitis.  I will give her amoxicillin.  I recommend that she minimize her nose blowing as this may create trauma and cause nosebleed.  Patient will continue to monitor her symptoms.  If she is not better in 1-2 weeks and to come back.  She verbalized understanding and agreed with the plan      ASSESSMENT/PLAN:  1. Sorethroat  - Rapid Strep A Screen-Throat  - Group A Strep, RNA Direct Detection, Throat    2. Sinusitis  - amoxicillin (AMOXIL) 500 MG capsule; Take 1 capsule (500 mg total) by mouth 2 (two) times a day for 10 days.  Dispense: 20 capsule; Refill: 0      Orders Placed This Encounter   Procedures     Rapid Strep A Screen-Throat     Group A Strep, RNA Direct Detection, Throat       CHIEF COMPLAINT:  Chief Complaint   Patient presents with     nasal discharge     green discharge x 1 week. Pregnat 32 weeks     Cough     x 1 week productive cough     Sore Throat       HISTORY OF PRESENT ILLNESS:  Dayana is a 31 y.o. female presenting to the clinic today for a URI. She is 32 weeks gestation with baby number 3. She has a cough and some rhinorrhea with congestion. Her cough and congestion are worse at night time. She notes some blood in her nasal discharge. She has been ill for one week. Her cough is productive with some green sputum. She does have a bit of a sore throat, and her voice is hoarse. Her rapid strep today is negative.     REVIEW OF SYSTEMS:   No fevers. No shortness of breath. All other systems are negative.    PFSH:  No new history.     TOBACCO USE:  History   Smoking Status     Never Smoker   Smokeless Tobacco     Never Used       VITALS:  Vitals:    03/14/17 1009   BP: 90/52   Patient Site: Left Arm   Patient Position: Sitting   Cuff Size: Adult Regular   Pulse: 80   Temp: 97.9  F (36.6  C)   TempSrc: Oral   Weight: 139 lb 8 oz (63.3 kg)     Wt Readings from Last 3 Encounters:   03/14/17 139 lb 8 oz (63.3 kg)   03/07/17 138 lb (62.6  kg)   02/21/17 136 lb (61.7 kg)       PHYSICAL EXAM:  Constitutional: Patient is oriented to person, place, and time. Patient appears well-developed and well-nourished. No distress.   Head: Normocephalic and atraumatic.   Right Ear: External ear normal. Slight erythema of TM.   Left Ear: External ear normal. Slight erythema of TM.   Nose: Nose normal.   Mouth/Throat: Slight peritonsillar erythema. No oropharyngeal exudate.   Eyes: Conjunctivae and EOM are normal. Pupils are equal, round, and reactive to light. Right eye exhibits no discharge. Left eye exhibits no discharge. No scleral icterus.   Cardiovascular: Normal rate, regular rhythm, normal heart sounds and intact distal pulses. No murmur heard.   Pulmonary/Chest: Effort normal and breath sounds normal. No stridor. No respiratory distress. Patient has no wheezes, no rales, exhibits no tenderness.       Results for orders placed or performed in visit on 03/14/17   Rapid Strep A Screen-Throat   Result Value Ref Range    Rapid Strep A Antigen No Group A Strep detected No Group A Strep detected         ADDITIONAL HISTORY SUMMARIZED (2): None.  DECISION TO OBTAIN EXTRA INFORMATION (1): None.   RADIOLOGY TESTS (1): None.  LABS (1): Ordered and reviewed labs today.  MEDICINE TESTS (1): None.  INDEPENDENT REVIEW (2 each): None.     The visit lasted a total of 6 minutes face to face with the patient. Over 50% of the time was spent counseling and educating the patient about sinusitis care.    INeris, am scribing for and in the presence of, Dr. Connor.    IDr. Connor, personally performed the services described in this documentation, as scribed by Neris Mora in my presence, and it is both accurate and complete.    MEDICATIONS:  Current Outpatient Prescriptions   Medication Sig Dispense Refill     calcium-vitamin D (CALCIUM-VITAMIN D) 500 mg(1,250mg) -200 unit per tablet Take 1-2 tablets by mouth daily as needed. 60 tablet 3     prenatal vit  #105-iron-FA-dha 30 mg iron- 1.4 mg-300 mg Cmpk Take 1 tablet by mouth daily. 30 each 9     pyridoxine, vitamin B6, (VITAMIN B-6) 50 MG tablet Take 1 tablet (50 mg total) by mouth 2 (two) times a day. 60 tablet 3     amoxicillin (AMOXIL) 500 MG capsule Take 1 capsule (500 mg total) by mouth 2 (two) times a day for 10 days. 20 capsule 0     diphenhydrAMINE HCl (UNISOM SLEEPMELTS) 25 mg TbDL Take 1 tablet by mouth bedtime. 30 each 3     No current facility-administered medications for this visit.        Total data points: 1

## 2021-06-09 NOTE — PROGRESS NOTES
Dayana is doing well.  She is here today with an .  She was prescribed amoxicillin for sinusitis and is feeling better.  We did discuss taking the full dose of medication.  She is feeling numbness and tingling in her right wrist and feeling tightness in her right thigh.  She does have a wrist splint from her second pregnancy at home and has not used it yet.  We did recommend that she try using the splint at night as she had concerns that she would not be able to use her hands during the day.  She also explains that if she gets up and walks that her leg will be less tight and will feel better.  She is unsure of her birth plan at this time.  She did have an epidural with her first pregnancy and her second labor went very quickly and was told that she did not have enough time for an epidural but had a lot of pain.  We did discuss that if there was time and if she wanted an epidural we would certainly get her medication as she wanted during her labor.  We discussed that at her next visit the CSS would help her preregister for the hospital online as she states she is unsure how to do that and does not think her  can do that either.  She is interested in Depo-Provera for postpartum contraception, the phone nubmer for billing ws given to her to find out about cost of this contraceptive method.  We did discuss risks benefits and alternatives to this method.  She does not want anything implantable and does not feel that she will take a pill regularly.  She is feeling active fetal movements denies contractions leaking of fluid or bleeding.  She also expressed interest in information about [facility] then and the number for the [facility] WIC office was given to her.

## 2021-06-10 NOTE — PROGRESS NOTES
"Dayana is here with  for routine PNV at 39 weeks.  She has questions about episiotomies.  She states she had one with both of her other two children and is wondering if it is best - she kept saying how it \"helped\" her baby's head come out.  I discussed the research and evidence on episiotomies and that we do not routinely do them.  Discussed most common scenario for doing one is for low FHTs and head not likely to deliver without episiotomy.  She is also worried about constipation after delivery.  She states with her last baby, she didn't have a BM until 1 week after the birth.  I discussed being proactive about stool softeners after birth and we will discuss before she goes home.  Good fetal movement.  No s/sx of labor.  Wrote down CNM number to call for labor and explained process of answering service, etc.  On Leopold's, fetal head ballotable above pelvis.    "

## 2021-06-10 NOTE — PROGRESS NOTES
Pt arrived into triage with C/O of bloody discharge v. SROM.  EFM placed and CNM updated.  Plan to do a Rom Plus per order and call CNM Hopes with results.

## 2021-06-10 NOTE — PROGRESS NOTES
"  Outpatient/Triage Note:     Patient Name: Dayana Mazariegos  :     1985  MRN:    [id number]        Assessment:   @ 39w6d  Irregular painless contractions, not in labor  Membranes intact  GBS positive     Plan:   - Discharge to home undelivered. Reviewed signs and symptoms of labor as well as  warning signs including decreased fetal movement, leaking of fluid, and vaginal bleeding. Reviewed how to contact on-call CNM. Follow-up in clinic with CNM as scheduled or sooner as needed. All questions answered and patient is in agreement with the plan.         Subjective:  Dayana Mazariegos is a 31 y.o.  at 39w 6d, with an EDC of 17 who presented to [facility] for evaluation of possible SROM. A [place]  was present via [employer], however the patient asked that her  interpret and that the [employer]  be shut off. Dayana reports she had \"a few drops of blood in the toilet\" this morning as well as a lot of mucus.  Discussed the characteristics of ROM and to call the CNM on call if she feels she has watery discharge.  Reassurance provided regarding the normalcy of increased mucus discharge and pink tinged or pink streaks in vaginal discharge.         Objective:  Vital signs: LMP 2016  FHR: 130 with moderate variability and no decelerations  Uterine contractions: q 4-6 min., 60-80 seconds     Abdomen: Vertex by Leopold's, abdomen non-tender  SVE: Deferred  Legs: No edema, +2DTR, clonus absent        Results:  ROM + : negative     Provider:NATE Salcido CNM        Total time with patient 5 mn >50% time spent counseling                            "
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
## 2021-06-10 NOTE — PROGRESS NOTES
"Discharged to home.  Pt verbalizes understanding discharge instructions.  Pt does speak limited amounts of English.  We did use the language line and pt states she \"never wants to use again\".  Her significant other is who she designates to interpret for her.  Pt has an appointment to she Nusrat LEMUS tomorrow.  Not feeling contractions.  Feels comfortable going home.   "

## 2021-06-10 NOTE — PROGRESS NOTES
Dayana Mazariegos is here with her  and  for a routine prenatal visit at 38w0d.  Dayana and her  had more questions about Group B strep.  Explained in detail what GBS is, treatment, and recommendations for baby after.  Gave handout.  Reassurance provided that it was nothing that Dayana did wrong - that 1 out of 5 women test positive for it.  Pre-registration is done since she had a triage visit last week.  Fetal movement is normal. Interval weight gain is approriate.  Encouraged to schedule weekly visits from to/through her EDB.   Anticipatory guidance, signs of symptoms of labor or SROM, third trimester warning signs, when to call and CNM contact information reviewed.

## 2021-06-10 NOTE — PROGRESS NOTES
Dayana Mazariegos is here by herself for a routine prenatal visit at 36w0d.  She has no concerns and is feeling well.  Fetal movement is normal. Interval weight gain is down 1 lb - appetite is normal.  Weight gain normal overall. Group B strep and hemoglobin discussed and obtained today.  VE:  3cm ex os, closed internal os/40/-2/soft/posterior, vertex palpated.  Encouraged to schedule weekly visits to/through her EDB.   Anticipatory guidance, signs of symptoms of labor or SROM, third trimester warning signs, when to call and CNM contact information reviewed.

## 2021-06-10 NOTE — PROGRESS NOTES
"Outpatient/Triage Note:    Patient Name:  Dayana Mazariegos  :      1985  MRN:      [id number]      Assessment:   @ 39w6d here for evaluation of SROM  GBS positive    Plan:   ROM + to evaluate possible SROM  Continuous fetal monitoring x 20 minutes, if category 1 then ok to be off monitor    Subjective:  Dayana Mazariegos is a 31 y.o.  at 39w 6d, with an EDC of 17 who presented to [facility] for evaluation of possible SROM. A [place]  was present via [employer], however the patient asked that her  interpret and that the [employer]  be shut off.  Dayana reports she had \"a few drops of blood in the toilet\" this morning as well as a lot of mucus. She denies watery discharge and/or needing to wear a pad due to constant moisture.  THe RN told me she was feeling contractions when she initially arrived to triage.  She denied contractions when I saw her.          Objective:  Vital signs: LMP 2016  FHR: 130 with moderate variability and no decelerations  Uterine contractions: q 2-7 min., 60-80 seconds    Abdomen: Vertex by Leopold's, abdomen non-tender  SVE: Deferred  Legs: No edema, +2DTR, clonus absent      Results:  Pending    Provider:NATE Salcido CNM      Total time with patient  10 mn >50% time spent counseling.          "
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
## 2021-06-10 NOTE — PROGRESS NOTES
"Outpatient/Triage Note:    Patient Name:  Dayana Mazariegos  :      1985  MRN:      [id number]    Assessment:   @ 37w0d   S/P mild abdominal trauma    Plan:   -Follow up with CNM at scheduled next week  - Discharge to home undelivered. Reviewed warning signs including decreased fetal movement, leaking of fluid, vaginal bleeding, or signs of labor. Reviewed how to contact on-call CNM. Follow-up in clinic with CNM as scheduled or sooner as needed. All questions answered. Agrees with plan.     Subjective  Dayana Mazariegos is a 31 y.o.  who presented to [facility] for evaluation of mild abdominal trauma yesterday.  [employer]  used for communication with RN at bedside.  Reports that she ran into a table on her LRQ.  It was painful for 3-4 hours but feels better today.  Reports increased FM yesterday and normal today.  Not feeling any increase in uterine activity.  Was seen in clinic this AM and offered eval at [facility].  Denies leaking of fluid, bleeding, or changes in fetal movement.  Blood type is positive.  Does not feel like she is in labor with the ctx she is having.  Pt desires discharge to home.    Objective  Temp:  [98.3  F (36.8  C)] 98.3  F (36.8  C)  Heart Rate:  [75-83] 79  Resp:  [14] 14  BP: ()/(60-69) 105/69    Physical Exam:  General appearance:  comfortable  Psych: AAO x3  Skin: Pink, warm & dry  HEENT: unremarkable  Cardiovascular:  Normal BP, reports no SOB  Respiratory:  Normal effort for breathing  Abdomen: soft, NT, gravid  Leopolds: Vertex         EFW:<4500 grams (AGA)     FHR:Baseline: 140 bpm, Variability: Moderate (6 - 25 bpm), Accelerations: \"present and Decelerations: Absent    Uterine contractions:Occasional with some irritability Duration: 20-90 seconds and Intensity: mild    SVE:deferred    Extremeties: WNL edema  none     Total time spent with patient:10 minutes, >50% spent on counseling and coordination of care.    Provider:  Tonya Velez CNM, " ALLAN SULLIVAN    Date:  4/18/2017  Time:  10:55 AM

## 2021-06-11 NOTE — PROGRESS NOTES
MIGUEL@Patient ID: Dayana Mazariegos is a 31 y.o. female.  Assessment:   Normal postpartum exam at ~6 weeks postpartum.   lactating    Plan:    1. Pap smear not done at today's visit. Due for annual Gyn exam in June 2018   2. Desired contraception: Depo-Provera injections.  Initially she stated she would wait a while to do the Depo-Provera injection, but then when I discussed the risk of waiting she decided to do it today due to her concern of an unplanned pregnancy.  Also discussed the possibility of an IUD and she expressed some interest in it.  We will continue to discuss further at future visits.  3. Discussed resumption of exercise and setting a goal to return to pre-pregnancy weight in the next 6-12 months.   4.  Resumption of intercourse reviewed with possible changes in libido and vaginal lubrication while nursing.  5.  Nutrition and supplements reviewed.  Advised continuation of a prenatal or multivitamin, also Vitamin D3 5000 IU geltab daily and an omega 3 fatty acid supplement.  I provided refills to her pharmacy of the prenatal vitamin and calcium.  I also prescribed one months worth of stool softeners, but discussed if stools continue to be hard she should consider follow-up with primary care physician.  6. Adjustment to parenting, self care and open communication with her support system discussed. Warning signs and symptoms related to postpartum mood disorders reviewed.   7.  Also recommended sitz baths for small hemorrhoid, and last little bit of healing for scant vaginal suture noted.  I reassured her of normal perineal and labial healing.    Total time spent with patient 45 minutes, >50% counseling, education and coordination of care.    Subjective:     Dayana Mazariegos is a 31 y.o. female who presents for postpartum visit. She is 6 weeks postpartum following a NSVB.  I have fully reviewed the prenatal and intrapartum course. The delivery was at Term and 40.0 weeks gestation Her baby boy is named  "Moisse and weighed 7 lbs 7 oz at birth.     Postpartum course has been stable. Baby's course has been stable. Baby is feeding breast and pumped breastmilk.  She is worried because her baby has not had a stool in 4 days and has tummy feels bigger.  I discussed that if he is exclusively  it can be normal for baby's to go up to a week to have a BM when exclusively . However, I encouraged her to discuss her concerns with the pediatrician and make an appointment with them to discuss her concerns.  Lochia ceased at 5 1/2 weeks postpartum.  Bowel function is normal - was constipated for the first week after birth.  She now has a BM every day, but feels like them are hard every time.  Notices some bleeding after bowel movements.  She is drinking lots of water, fruits/vegetable intact is good. Bladder function is normal.  She is also worried about sutures that she can still feel vaginally and she feels like 1 of her labia is lower than the other labia.  She is worried about getting a \"disease\" due to this difference.  I reassured her that changes after birth due to cosmetic reasons are not concerning for getting a disease.  She has not resumed intercourse. Desired contraception: Depo-Provera injections. [place] postpartum depression screening score: 2. She has not resumed regular exercise.  Patient stays at home with her children.      Review of Systems -see HPI  General:  Denies problem  Eyes: Denies problem  Ears/Nose/Throat: Denies problem  Cardiovascular: Denies problem  Respiratory:  Denies problem  Gastrointestinal:  Denies problem, Genitourinary: Denies problem  Musculoskeletal:  Denies problem  Skin: Denies problem  Neurologic: Denies problem  Psychiatric: Denies Problem  Endocrine: Denies problem    Objective:         Physical Exam:  General Appearance: Alert, cooperative, no distress, appears stated age  Skin: Skin color, texture, turgor normal, no rashes or lesions  Throat: Lips, mucosa, and " tongue normal; teeth and gums normal  HEENT: grossly normal; otoscopic and opthalmic exam not performed.   Neck: Supple, symmetrical, trachea midline, no adenopathy;  thyroid: not enlarged, symmetric, no tenderness/mass/nodules  Lungs: Clear to auscultation bilaterally, respirations unlabored  Breasts: No breast masses, tenderness, asymmetry, or nipple discharge.  Heart: Regular rate and rhythm, S1 and S2 normal, no murmur, rub, or gallop  Abdomen: Soft, non-tender.   Pelvic:External genitalia normal without lesions or irritation. Vagina and cervix show no lesions, inflammation, discharge or tenderness. 2nd degree perineal laceration well approximated and well healed scant, almost dissolved ~3mm of suture noted vaginally.  Right labial laceration well healed.  Small 1cm by 3mm hemorrhoid noted - soft and pink.   No cystocele, No rectocele. Uterus fully involuted.  No adnexal mass or tenderness.  Extremities: Extremities normal without varicosities or edema       Last Pap: 6/18/2016. Results were: normal  Immunization History   Administered Date(s) Administered     Influenza,seasonal quad, PF, 36+MOS 11/15/2016     Tdap 02/21/2017     Immunization status: up to date and documented, stated as current, but no records available

## 2021-06-12 NOTE — PROGRESS NOTES
"Assessment:     31 y.o.  Hemorrhoid  Cyst at vaginal introitus, painful     Plan:     -Increase oral fluids, fiber rich foods, OTC prep-H cream, daily ativity (waling or other form of exercise) to decrease constipation/hemorroihdal discomfort.  -Referral was previously placed to [facility] for patient regarding perineal pain and cyst. Encouraged pt to make apt with OBGYN to assess for need for I&D of painful cyst. Pt has phone number to call.    Subjective:       Dayana Mazariegos is a 31 y.o. female who presents for evaluation of hemorrhoid. Reports itching and pain at hemorrhoid. Sometimes notes bleeding when straining if constipated.  Also c/o cyst in vagina that has not resolved with tub soaks. States she overal feels much better since visit on 7/25/17 after being diagnosed with infection at the site of perineal repair following vaginal delivery. She was TX'd with oral Keflex 500 mg BID x 10 days. She also had a pelvic US done that was normal (no signs of retained POC) and a negative wet prep. She was encouraged to f/u with [facility]. Pt denies signs of vaginal itching, odor, discharge, bleeding, fever, flu-like sxs. Notes occasional discomfort at site of cyst.    Visit completed with in-person .    Review of Systems  Pertinent items are noted in HPI.      Objective:     BP 94/58 (Patient Site: Right Arm, Patient Position: Sitting, Cuff Size: Adult Regular)  Pulse 60  Resp 16  Ht 5' 2\" (1.575 m)  Wt 117 lb (53.1 kg)  BMI 21.4 kg/m2      General appearance: alert, appears stated age, cooperative and no distress  Pelvic: SSE not performed. External genitalia normal, small 0.5 x 0.5 cm cyst noted near hymenal ring/at 7'o clock. Another smaller nodule noted adjacent to this. Consistent with findings of exam on 7/25. Applied mild and then moderate pressure to cyst without any ability to expel contents. Pt reports pain with palpation. Vaginal-rectal septum intact. Small 1cm by 3mm hemorrhoid noted - soft " and pink. No sign of thrombosis or bleeding.   Extremities: extremities normal, atraumatic, no cyanosis or edema     Total time spent with patient: 20 minutes, >50% time spent counseling and coordination of care.    NATE Gonzalez,CNM

## 2021-06-12 NOTE — PROGRESS NOTES
Assessment:     2 months postpartum with persistent perineal pain, vaginal bleeding and itching  Infection at site of perineal laceration     Plan:   -Discussed exam findings and assessment with patient and  on speaker phone.   acted as .  -Ultrasound to evaluate persistent vaginal bleeding  - Rx for antibiotics: Cephalexin 500mg BID X 10 days.    -Symptom relief reviewed and encouraged daily tub soaks, avoiding intercourse at this time and wearing cotton underwear.    -Referral to [facility].Enc to call for an appointment if pain is unresolved in the next 2-3 days after initiating antibiotic treatment.   -Case reviewed with Dr. Quintero, who is in agreement with the above plan.  TT with patient 25mns >50% time spent in counseling or coordination of care.     Subjective:   No  available, patient called  who was put on speaker phone and acted as an . Dayana Mazariegos is a 31 y.o. female who presents for evaluation of vaginal and perineal pain.  She had a postpartum visit approximately a month ago and had her laceration assessed at that time.  She states CNM noted suture strings were visible on the perineum but no signs of infection.  Patient was encouraged to do daily tub soaks.  She has not done these regularly and in the last several weeks she has noticed perineal pain and itching.  She also notes that she has had persistent vaginal bleeding since birth.  It is usually very light and she only has to wear a liner though sometimes it is slightly heavier.  She does not think her period has returned since the birth.  She did get a double shot at her postpartum visit.  She has not resumed intercourse since the birth due to discomfort in her vagina.    She otherwise feels well, denies fever, chills, abdominal tenderness.    Review of Systems  Pertinent items are noted in HPI.      Objective:   /58 (Patient Site: Left Arm, Patient Position: Sitting, Cuff Size: Adult  "Regular)  Pulse 68  Temp 98.2  F (36.8  C) (Oral)   Ht 5' 2\" (1.575 m)  Wt 117 lb (53.1 kg)  Breastfeeding? No  BMI 21.4 kg/m2    Physical Exam:  General: Pleasant, articulate, well-groomed, well-nourished female.  Not in any apparent distress.  Skin: no lesions or rashes.  Abdomen: Soft, nontender, no masses, negative CVAT.  Pelvic:  External genitalia: Normal hair distribution, no lesions.  Urethral opening: Without lesions, or tenderness.   Bladder: Without masses, or tenderness.  Perineum: Intact laceration, small amount of scar tissue visible.  Vagina: Small 1 cm cystlike structure noted underneath the skin on patient's right.  Slight erythema of introitus.  2 small nodules, potentially pus filled noted 1 cm inside the vaginal opening on patient's right.  Rugated, normal-appearing discharge with scant blood noted throughout vagina. Wet prep obtained.  Cervix: parous, pink, smooth, no lesions.    Bimanual: Uterus small, mobile, nontender, no masses.  No CMT.  Adnexa, without masses or tenderness.    Lower extremities: +1 reflexes, no significant edema.         "

## 2021-06-13 NOTE — PROGRESS NOTES
Pt was seen on 10/8/17 by Dr Sadia Ricketts to evaluate and treat a 2 cm vaginal cyst. An I&D was performed in clinic and she was scheduled for follow up visit in 2 weeks time.  Transcribed from consult letter 10/30/17  Didi Rivera, NATE,ALLAN

## 2021-06-13 NOTE — PROGRESS NOTES
"Dayana Mazariegos is a 35 y.o. female who is being evaluated via a billable telephone visit.      The patient has been notified of following:     \"This telephone visit will be conducted via a call between you and your physician/provider. We have found that certain health care needs can be provided without the need for a physical exam.  This service lets us provide the care you need with a short phone conversation.  If a prescription is necessary we can send it directly to your pharmacy.  If lab work is needed we can place an order for that and you can then stop by our lab to have the test done at a later time.    Telephone visits are billed at different rates depending on your insurance coverage. During this emergency period, for some insurers they may be billed the same as an in-person visit.  Please reach out to your insurance provider with any questions.    If during the course of the call the physician/provider feels a telephone visit is not appropriate, you will not be charged for this service.\"    Patient has given verbal consent to a Telephone visit? Yes      Patient would like to receive their AVS by mail    Additional provider notes:   Dayana Mazariegos 35 y.o. female presented for telephone encounter.    Chief Complaint   Patient presents with     Headache     x few days     Ear Pain        Patient Active Problem List   Diagnosis   (none) - all problems resolved or deleted      Current Outpatient Medications on File Prior to Visit   Medication Sig Dispense Refill     calcium-vitamin D (CALCIUM-VITAMIN D) 500 mg(1,250mg) -200 unit per tablet Take 1-2 tablets by mouth daily as needed. 60 tablet 4     ibuprofen (ADVIL,MOTRIN) 600 MG tablet Take 1 tablet (600 mg total) by mouth every 6 (six) hours as needed for pain. 28 tablet 0     prenatal vit,estrada 74-iron-folic 27 mg iron- 1 mg Tab Take 1 tablet by mouth daily. 90 tablet 4     No current facility-administered medications on file prior to visit.       Past Surgical " History:   Procedure Laterality Date     INCISION AND DRAINAGE OF WOUND  10/2017    vaginal cyst I&D at [facility]     Family History   Problem Relation Age of Onset     Breast cancer Mother      Early death Mother      No Medical Problems Father      No Medical Problems Sister      No Medical Problems Brother        S:    Used [employer] phone  but was unsuccessful  I tried again and spoke to patient & her   Feeling hot/cold, chills, pain in both ears, headache  Symptoms noted 5 days  No fever    O:  Patient sound alert, coherent, and not in distress  Discussed diagnostic limitations    Assessment/Plan:  1. Acute otitis media, unspecified otitis media type  - amoxicillin-clavulanate (AUGMENTIN) 875-125 mg per tablet; Take 1 tablet by mouth 2 (two) times a day for 10 days.  Dispense: 20 tablet; Refill: 0  Suspect otitis media  F/u in person if not better after 1-2wks      Phone call duration:  21 minutes    Darya Edwards MD

## 2021-06-14 NOTE — PROGRESS NOTES
"Assessment/Plan:        Diagnoses and all orders for this visit:    Menorrhagia - long menstrual cycle  -     ibuprofen (ADVIL,MOTRIN) 800 MG tablet; Take 1 tablet (800 mg total) by mouth every 8 (eight) hours for 15 doses.  Dispense: 15 tablet; Refill: 0  -     Hemoglobin    On Depo-Provera for contraception    Surveillance for Depo-Provera contraception  -     ibuprofen (ADVIL,MOTRIN) 800 MG tablet; Take 1 tablet (800 mg total) by mouth every 8 (eight) hours for 15 doses.  Dispense: 15 tablet; Refill: 0  -     medroxyPROGESTERone injection 150 mg (DEPO-PROVERA); Inject 1 mL (150 mg total) into the shoulder, thigh, or buttocks once.    Tchg:  Discussed normalcy of bleeding irregularities for women on Depo-Provera and that 70% of women experience these in the first year.  Discussed normal uterine size along with no blood/bleeding noted with exam (Dayana states \"I cleaned myself before I came.\").  Discussed other option of birth control and she would very much like to stay with Depo-Provera because she only has to come back q 3 mths.    Discussed using Ibuprofen Rx x5 days as noted above to decrease bleeding.   Reassured of normal cyst healing.  If Hgb abnormal, will consider more f-up.  Reviewed warning signs and when to call.      NATE Aguayo CNM  12/5/2017  2:02 PM    Total time spent with patient 20 minutes, >50% counseling, education and coordination of care.            Subjective:    Patient ID: Dayana Mazariegos is a 32 y.o. female. Here for c/o bleeding x14 days - this was her first period since she stopped breastfeeding her baby at 4 months postpartum.  She is now almost 7 months postpartum.  She states that she is changing a pad x3-4 hours.  She is concerned about the length of time of bleeding.  Notices mild menstrual cramps.  No clots.  She does like the Depo-Provera and would like to continue with that method.      Of note, Dayana had a vaginal cyst removal at [facility] in October, she did not see " Dr. Ricketts for follow up on that.      HPI    The following portions of the patient's history were reviewed and updated as appropriate: allergies, current medications, past family history, past medical history, past social history, past surgical history and problem list.    Review of Systems   Constitutional: Positive for fatigue. Negative for chills and fever.   Cardiovascular: Negative.    Gastrointestinal: Negative.    Endocrine: Negative.    Genitourinary: Positive for menstrual problem. Negative for decreased urine volume, dyspareunia, dysuria, enuresis, flank pain, frequency, genital sores, hematuria, pelvic pain, urgency, vaginal discharge and vaginal pain.   Neurological: Negative.    Hematological: Negative.              Objective:    Physical Exam   Constitutional: She is oriented to person, place, and time. She appears well-developed and well-nourished.   Eyes: Pupils are equal, round, and reactive to light.   Pulmonary/Chest: Effort normal.   Abdominal: Soft.   Genitourinary: Vagina normal and uterus normal. No vaginal discharge found.   Genitourinary Comments: Visually, area of I&D appears healed.  No blood noted externally.  Bimanual reveals normal sized uterus, no spotting/discharge/or blood on glove.     Musculoskeletal: Normal range of motion.   Neurological: She is alert and oriented to person, place, and time.   Skin: Skin is warm and dry.   Psychiatric: She has a normal mood and affect. Her behavior is normal. Judgment and thought content normal.

## 2021-06-14 NOTE — PROGRESS NOTES
Assessment:     1. Cough  azithromycin (ZITHROMAX) 250 MG tablet    benzonatate (TESSALON) 200 MG capsule   2. Fever  Influenza A/B Rapid Test   3. Throat pain  Rapid Strep A Screen-Throat    Group A Strep, RNA Direct Detection, Throat          Plan:     Given the duration of her symptoms continued fever and feeling like she is getting worse, I did elect to give her a prescription for azithromycin.  Negative for strep and influenza.  Recommend pushing fluids and follow-up if her symptoms are getting worse or not continuing to improve.    Subjective:       32 y.o. female presents for evaluation of a one-week history of cough and fever.  She is also had a headache and sore throat.  Her T-max has been 102.  She is continued to have a fever now for a week and her temperature today is 100.5.  She feels like her cough is getting worse.  She has been having headaches and has vomited.  Is been taking Motrin for her symptoms.  She denies any rashes or abdominal pain.    The following portions of the patient's history were reviewed and updated as appropriate: allergies, current medications, past family history, past medical history, past social history, past surgical history and problem list.    Review of Systems  A 12 point comprehensive review of systems was negative except as noted.     Objective:      /64  Pulse 84  Temp 100.5  F (38.1  C) (Oral)   Resp 18  Wt 119 lb (54 kg)  SpO2 98%  Breastfeeding? No  BMI 22.48 kg/m2  General appearance: alert and Mildly ill-appearing, but in no distress.  Eyes: conjunctivae/corneas clear. PERRL, EOM's intact. Fundi benign.  Ears: normal TM's and external ear canals both ears  Throat: Erythema in the posterior oropharynx without significant tonsillar hypertrophy or exudate seen.  Neck: no adenopathy  Lungs: clear to auscultation bilaterally  Heart: regular rate and rhythm, S1, S2 normal, no murmur, click, rub or gallop  Abdomen: soft, non-tender; bowel sounds normal; no  masses,  no organomegaly  Extremities: extremities normal, atraumatic, no cyanosis or edema  Skin: Skin color, texture, turgor normal. No rashes or lesions     Recent Results (from the past 24 hour(s))   Influenza A/B Rapid Test   Result Value Ref Range    Influenza  A, Rapid Antigen No Influenza A antigen detected No Influenza A antigen detected    Influenza B, Rapid Antigen No Influenza B antigen detected No Influenza B antigen detected   Rapid Strep A Screen-Throat   Result Value Ref Range    Rapid Strep A Antigen No Group A Strep detected, presumptive negative No Group A Strep detected, presumptive negative          This note has been dictated using voice recognition software. Any grammatical or context distortions are unintentional and inherent to the software

## 2021-06-14 NOTE — PROGRESS NOTES
Normal result reviewed and review normal result with patient and spouse over the phone and encouraged routine follow up.

## 2021-06-14 NOTE — PROGRESS NOTES
ASSESSMENT/PLAN:  Trapezius muscle strain with Headache  -     HM2(CBC w/o Differential)  -     Comprehensive Metabolic Panel  -     cyclobenzaprine (FLEXERIL) 10 MG tablet; Take 1 tablet (10 mg total) by mouth 3 (three) times a day as needed for muscle spasms.  Dispense: 30 tablet; Refill: 0  Call in 2 wks if not better      Orders Placed This Encounter   Procedures     HM2(CBC w/o Differential)     Comprehensive Metabolic Panel       CHIEF COMPLAINT:  Chief Complaint   Patient presents with     Headache     x 1 day     Neck Pain       HISTORY OF PRESENT ILLNESS:  Dayana is a 32 y.o. female presenting to the clinic today for fever and headache. This morning, she had a fever. For the past 10 days however, she has had neck pain and headache. She has been taking Tylenol, and it makes it better, but it keeps returning. She rates the headache at a 7/10. No vision changes, nausea or vomiting. The headaches are new for her in the past ten days. she does feel a tightness in her neck. She does feel some pain to the touch along the neck. She did have some throat discomfort, and she feels like she has some post nasal drip. She has been alternating between Tylenol and Mucinex. She has been seen twice in November for fevers and cough. She had rapid strep and influenza's done on 11/12/2017, and these were negative. She was given azithromycin on 11/12/2017. She finish the antibiotics. No more fevers, cough or runny nose. She does think that she has some allergies. Vitals today are stable.     REVIEW OF SYSTEMS:    HENT: Negative.   Eyes: Negative.   Respiratory: Negative.   Cardiovascular: Negative.   Gastrointestinal: Negative.   Endocrine: Negative.   Genitourinary: Negative.   Musculoskeletal: Negative.   Skin: Negative.   Allergic/Immunologic: Negative.   Hematological: Negative.   Psychiatric/Behavioral: Negative.   All other systems are negative.    PFSH:  No new history.     TOBACCO USE:  History   Smoking Status      Never Smoker   Smokeless Tobacco     Never Used       VITALS:  Vitals:    11/27/17 1023   BP: 98/52   Pulse: 77   Temp: 97.9  F (36.6  C)   TempSrc: Oral   SpO2: 98%   Weight: 120 lb 4 oz (54.5 kg)     Wt Readings from Last 3 Encounters:   11/27/17 120 lb 4 oz (54.5 kg)   11/12/17 119 lb (54 kg)   11/04/17 116 lb (52.6 kg)       PHYSICAL EXAM:  Constitutional: Patient is oriented to person, place, and time. Patient appears well-developed and well-nourished. No distress.   Head: Normocephalic and atraumatic.   Right Ear: External ear normal. Ear canal and TM normal.   Left Ear: External ear normal. Ear canal and TM normal.   Nose: Nose normal.   Mouth/Throat: Oropharynx is clear and moist. No oropharyngeal exudate.   Eyes: Conjunctivae and EOM are normal. Pupils are equal, round, and reactive to light. Right eye exhibits no discharge. Left eye exhibits no discharge. No scleral icterus.   Cardiovascular: Normal rate, regular rhythm, normal heart sounds and intact distal pulses. No murmur heard.   Pulmonary/Chest: Effort normal and breath sounds normal. No stridor. No respiratory distress. Patient has no wheezes, no rales, exhibits no tenderness.   Musculoskeletal: Tenderness to palpation to the trapezius muscle bilaterally.     Results for orders placed or performed in visit on 11/12/17   Influenza A/B Rapid Test   Result Value Ref Range    Influenza  A, Rapid Antigen No Influenza A antigen detected No Influenza A antigen detected    Influenza B, Rapid Antigen No Influenza B antigen detected No Influenza B antigen detected   Rapid Strep A Screen-Throat   Result Value Ref Range    Rapid Strep A Antigen No Group A Strep detected, presumptive negative No Group A Strep detected, presumptive negative   Group A Strep, RNA Direct Detection, Throat   Result Value Ref Range    Group A Strep by PCR No Group A Strep rRNA detected No Group A Strep rRNA detected       ADDITIONAL HISTORY SUMMARIZED (2): Reviewed note from ED from  11/4/2017. Reviewed walk in clinic note from 11/12/2017 regarding fever and cough.   DECISION TO OBTAIN EXTRA INFORMATION (1): None.   RADIOLOGY TESTS (1): reviewed cxr.  LABS (1): Reviewed and ordered labs today.  MEDICINE TESTS (1): None.  INDEPENDENT REVIEW (2 each): None.     I, Neris Mora, am scribing for and in the presence of, Dr. Connor.    IDarya MD , personally performed the services described in this documentation, as scribed by Neris Mora in my presence, and it is both accurate and complete.    MEDICATIONS:  Current Outpatient Prescriptions   Medication Sig Dispense Refill     calcium-vitamin D (CALCIUM-VITAMIN D) 500 mg(1,250mg) -200 unit per tablet Take 1-2 tablets by mouth daily as needed. 60 tablet 4     medroxyPROGESTERone (DEPO-PROVERA) 150 mg/mL injection Inject 150 mg into the shoulder, thigh, or buttocks every 3 (three) months.       cyclobenzaprine (FLEXERIL) 10 MG tablet Take 1 tablet (10 mg total) by mouth 3 (three) times a day as needed for muscle spasms. 30 tablet 0     prenatal vit,estrada 74-iron-folic 27 mg iron- 1 mg Tab Take 1 tablet by mouth daily. 90 tablet 4     witch hazel (TUCKS) 12.5-50 % PadM Use as directed on package  0     No current facility-administered medications for this visit.        Total data points: 4

## 2021-06-14 NOTE — PROGRESS NOTES
"Dayana Mazariegos is a 35 y.o. female who is being evaluated via a billable video visit.      How would you like to obtain your AVS? Mail a copy.    Will anyone else be joining your video visit? Yes: Jefferson her . How would they like to receive their invitation? Text to cell phone: same as Pt      Video Start Time: 12:17 PM  Assessment & Plan     aDyana was seen today for headache.    Diagnoses and all orders for this visit:    Acute non-recurrent maxillary sinusitis  -     amoxicillin-clavulanate (AUGMENTIN) 875-125 mg per tablet; Take 1 tablet by mouth 2 (two) times a day for 10 days.  Supportive cares  F/u if not better after 2 wks    Darya Edwards MD  [facility]    Subjective     Dayana Mazariegos is 35 y.o. and presents to clinic today for the following health issues   HPI     Spoke with  and patient  Sinus pressure and pain for \"many days\"  Getting worse  Bad headache  No fever  No sore throat   No cough  No close contact with COVID19       Objective       Vitals:  No vitals were obtained today due to virtual visit.    Physical Exam   Alert, oriented, NAD          Video-Visit Details    Type of service:  Video Visit    Video End Time (time video stopped): 12:27 PM  Originating Location (pt. Location): Home    Distant Location (provider location):  [facility]     Platform used for Video Visit: Bhaskar  "

## 2021-06-14 NOTE — PROGRESS NOTES
ASSESSMENT/PLAN:  Pleasant 32 y.o.  female 7 months postpartum presents with numbness of fingers of right hand and right arm pain ongoing since pregnancy (8-9 months ago).  Examination was unremarkable today.  I will get labs and do EMG (per spine clinic).  The patient may continue with OTC symptomatic treatment.  Further management will depend on results and her symptoms. The patient verbalized understanding and agreed with the plan.      Paresthesia of fingers of right hand  -     Glycosylated Hemoglobin A1c  -     Comprehensive Metabolic Panel  -     HM2(CBC w/o Differential)  -     Thyroid Cascade  -     Vitamin B12  -     Ambulatory referral to Spine Care    Orders Placed This Encounter   Procedures     Glycosylated Hemoglobin A1c     Comprehensive Metabolic Panel     HM2(CBC w/o Differential)     Thyroid Cascade     Vitamin B12     Ambulatory referral to Spine Care     Referral Priority:   Routine     Referral Type:   Consultation     Referral Reason:   Evaluation and Treatment     Number of Visits Requested:   1       CHIEF COMPLAINT:  Chief Complaint   Patient presents with     arm/shoulder/hand numbness     right side feels week x 7 months     Headache       HISTORY OF PRESENT ILLNESS:  Dayana is a 32 y.o. female presenting to the clinic today for right arm numbness. A Pashto  is present for communication today. This numbness started for her about 7-8 months ago while she was pregnant. She felt it just in her fingers of the right hand. Her OB said that the numbness would go away after birth, but her baby is 7 months, and she is still feeling the numbness. If she uses the hand more, the numbness is worse. She has NO associated pain. She is a , and working can make the numbness worse. She does have some shoulder pain that goes into the arm. The numbness is just localized to the fingers. No neck pain, no more headaches. She is no longer breast feeding her baby.     REVIEW OF SYSTEMS:    Constitutional: Negative.   HENT: Negative.   Eyes: Negative.   Respiratory: Negative.   Cardiovascular: Negative.   Gastrointestinal: Negative.   Endocrine: Negative.   Genitourinary: Negative.   Skin: Negative.   Allergic/Immunologic: Negative.   Neurological: Negative.   Hematological: Negative.   Psychiatric/Behavioral: Negative.   All other systems are negative.    PFSH:  No new history.     TOBACCO USE:  History   Smoking Status     Never Smoker   Smokeless Tobacco     Never Used       VITALS:  Vitals:    12/19/17 0908   BP: (!) 86/42   Pulse: 60   Weight: 120 lb 7 oz (54.6 kg)     Wt Readings from Last 3 Encounters:   12/19/17 120 lb 7 oz (54.6 kg)   12/05/17 121 lb (54.9 kg)   11/27/17 120 lb 4 oz (54.5 kg)       PHYSICAL EXAM:  Constitutional: Patient is oriented to person, place, and time. Patient appears well-developed and well-nourished. No distress.   Head: Normocephalic and atraumatic.   Right Ear: External ear normal.   Left Ear: External ear normal.   Nose: Nose normal.   Mouth/Throat: Oropharynx is clear and moist. No oropharyngeal exudate.   Eyes: Conjunctivae and EOM are normal. Pupils are equal, round, and reactive to light. Right eye exhibits no discharge. Left eye exhibits no discharge. No scleral icterus.   Neck: Neck supple. No JVD present. No tracheal deviation present. No thyromegaly present.   Cardiovascular: Normal rate, regular rhythm, normal heart sounds and intact distal pulses. No murmur heard.   Pulmonary/Chest: Effort normal and breath sounds normal. No stridor. No respiratory distress. Patient has no wheezes, no rales, exhibits no tenderness.   Abdominal: Soft. Bowel sounds are normal. Patient exhibits no distension and no mass. There is no tenderness. There is no rebound and no guarding.   Lymphadenopathy:  Patient has no cervical adenopathy.   Neurological: Patient is alert and oriented to person, place, and time. Patient has normal reflexes. No cranial nerve deficit.  Coordination normal.   Skin: Skin is warm and dry. No rash noted. Patient is not diaphoretic. No erythema. No pallor.  Musculoskeletal: Full range of motion of shoulders. Good interosseous muscle. Full range of motion of right wrist.    Results for orders placed or performed in visit on 12/19/17   HM2(CBC w/o Differential)   Result Value Ref Range    WBC 7.9 4.0 - 11.0 thou/uL    RBC 5.16 3.80 - 5.40 mill/uL    Hemoglobin 15.5 12.0 - 16.0 g/dL    Hematocrit 45.9 35.0 - 47.0 %    MCV 89 80 - 100 fL    MCH 30.1 27.0 - 34.0 pg    MCHC 33.8 32.0 - 36.0 g/dL    RDW 11.7 11.0 - 14.5 %    Platelets 196 140 - 440 thou/uL    MPV 9.2 7.0 - 10.0 fL         ADDITIONAL HISTORY SUMMARIZED (2): None.  DECISION TO OBTAIN EXTRA INFORMATION (1): None.   RADIOLOGY TESTS (1): None.  LABS (1): Ordered labs today.  MEDICINE TESTS (1): None.  INDEPENDENT REVIEW (2 each): None.     I, Neris Mora, am scribing for and in the presence of, Dr. Connor.    IDarya MD , personally performed the services described in this documentation, as scribed by Neris Mora in my presence, and it is both accurate and complete.    MEDICATIONS:  Current Outpatient Prescriptions   Medication Sig Dispense Refill     calcium-vitamin D (CALCIUM-VITAMIN D) 500 mg(1,250mg) -200 unit per tablet Take 1-2 tablets by mouth daily as needed. 60 tablet 4     prenatal vit,estrada 74-iron-folic 27 mg iron- 1 mg Tab Take 1 tablet by mouth daily. 90 tablet 4     medroxyPROGESTERone (DEPO-PROVERA) 150 mg/mL injection Inject 150 mg into the shoulder, thigh, or buttocks every 3 (three) months.       No current facility-administered medications for this visit.        Total data points: 1

## 2021-06-15 NOTE — PROGRESS NOTES
Patient presents at the request of Dr. Connor for right upper extremity EMG.  She has right hand numbness and tingling all digits.  Started approximately 20 months ago during her pregnancy with her youngest boy.  Has not improved after pregnancy and may be getting worse.  All fingers involved and she is right-handed.    EMG/NCS  results: Please see scanned full report.    Comment NCS: Abnormal study:    1.  Prolonged right median versus ulnar transcarpal latency comparison.  2.  Normal latency and amplitudes of remainder right upper extremity nerve conduction studies.    Comment EMG: Normal study.  1.  Normal needle EMG right upper extremity.    Interpretation: Abnormal study:    1.  Right median neuropathy at the wrist consistent with entrapment in the carpal tunnel, very mild in severity.    2. There is no electrodiagnostic evidence of cervical radiculopathy, brachial plexopathy, or ulnar neuropathy in the right upper extremity.    Note: Patient may trial carpal tunnel splint at night and follow-up with primary care provider for further recommendations.    The testing was completed in its entirety by the physician.      It was our pleasure caring for your patient today, if there any questions or concerns please do not hesitate to contact us.

## 2021-06-16 NOTE — PROGRESS NOTES
ASSESSMENT/PLAN:  Carpal tunnel syndrome on right  Discussed wearing brace and OTC analgesics for pain management  Will refer to orthopedic  Patient and  agreed with the plan  -     Ambulatory referral to Orthopedic Surgery        Orders Placed This Encounter   Procedures     Ambulatory referral to Orthopedic Surgery     Referral Priority:   Routine     Referral Type:   Surgical     Referral Reason:   Evaluation and Treatment     Requested Specialty:   [facility]     Number of Visits Requested:   1           CHIEF COMPLAINT:  Chief Complaint   Patient presents with     numbness on both hand     x more than 1 year       HISTORY OF PRESENT ILLNESS:  Dayana is a 32 y.o. female presenting to the clinic today for a follow up for her right hand paresthesia. Here with her  today, who is also interpreting for us. She had an EMG of her right hand on 1/17/2018 which revealed a mild carpal tunnel. She has had ongoing symptoms for almost 2 years now. Per , she has tried a wrist splint, but only would wear it for about 30 minutes per day. Her symptoms remain unchanged for her. She continues to feel numbness in the whole right hand, and all of her fingers. She does do repetitive movements with her hands and wrists at work. She is not taking anything for pain.     Contraception: She is currently getting Depo-provera for birth control. This does work well for her, but she does not like the side effects of weight gain and irregular vaginal bleeding. She is wondering about other options for birth control. She does not like OCPs because she is has not been consistent with taking them in the past.     REVIEW OF SYSTEMS:   Constitutional: Negative.   HENT: Negative.   Eyes: Negative.   Respiratory: Negative.   Cardiovascular: Negative.   Gastrointestinal: Negative.   Endocrine: Negative.   Genitourinary: Negative.   Skin: Negative.   Allergic/Immunologic: Negative.   Neurological: Negative.    Hematological: Negative.   Psychiatric/Behavioral: Negative.   All other systems are negative.    PFSH:  No new history.     TOBACCO USE:  History   Smoking Status     Never Smoker   Smokeless Tobacco     Never Used       VITALS:  Vitals:    02/13/18 0904   BP: 106/60   Pulse: 76   Weight: 120 lb 5 oz (54.6 kg)     Wt Readings from Last 3 Encounters:   02/13/18 120 lb 5 oz (54.6 kg)   12/19/17 120 lb 7 oz (54.6 kg)   12/05/17 121 lb (54.9 kg)       PHYSICAL EXAM:  Constitutional: Patient is oriented to person, place, and time. Patient appears well-developed and well-nourished. No distress.   Cardiovascular: Normal rate.  Pulmonary/Chest: Effort normal. No respiratory distress.   Neurological: Patient is alert and oriented to person, place, and time.      Results for orders placed or performed in visit on 12/19/17   Glycosylated Hemoglobin A1c   Result Value Ref Range    Hemoglobin A1c 5.2 3.5 - 6.1 %   Comprehensive Metabolic Panel   Result Value Ref Range    Sodium 143 136 - 145 mmol/L    Potassium 4.5 3.5 - 5.0 mmol/L    Chloride 107 98 - 107 mmol/L    CO2 26 22 - 31 mmol/L    Anion Gap, Calculation 10 5 - 18 mmol/L    Glucose 93 70 - 125 mg/dL    BUN 10 8 - 22 mg/dL    Creatinine 0.70 0.60 - 1.10 mg/dL    GFR MDRD Af Amer >60 >60 mL/min/1.73m2    GFR MDRD Non Af Amer >60 >60 mL/min/1.73m2    Bilirubin, Total 0.6 0.0 - 1.0 mg/dL    Calcium 9.5 8.5 - 10.5 mg/dL    Protein, Total 7.4 6.0 - 8.0 g/dL    Albumin 3.9 3.5 - 5.0 g/dL    Alkaline Phosphatase 112 45 - 120 U/L    AST 19 0 - 40 U/L    ALT 26 0 - 45 U/L   HM2(CBC w/o Differential)   Result Value Ref Range    WBC 7.9 4.0 - 11.0 thou/uL    RBC 5.16 3.80 - 5.40 mill/uL    Hemoglobin 15.5 12.0 - 16.0 g/dL    Hematocrit 45.9 35.0 - 47.0 %    MCV 89 80 - 100 fL    MCH 30.1 27.0 - 34.0 pg    MCHC 33.8 32.0 - 36.0 g/dL    RDW 11.7 11.0 - 14.5 %    Platelets 196 140 - 440 thou/uL    MPV 9.2 7.0 - 10.0 fL   Thyroid Cascade   Result Value Ref Range    TSH 1.11 0.30 -  5.00 uIU/mL   Vitamin B12   Result Value Ref Range    Vitamin B-12 893 (H) 213 - 816 pg/mL         ADDITIONAL HISTORY SUMMARIZED (2): Reviewed note from spine clinic from 1/17/2018 regarding EMG.  DECISION TO OBTAIN EXTRA INFORMATION (1): None.   RADIOLOGY TESTS (1): None.  LABS (1): None.  MEDICINE TESTS (1): None.  INDEPENDENT REVIEW (2 each): None.     The visit lasted a total of 12 minutes face to face with the patient. Over 50% of the time was spent counseling and educating the patient about her carpal tunnel syndrome.    I, Neris Mora, am scribing for and in the presence of, Dr. Connor.    I, Darya Edwards MD , personally performed the services described in this documentation, as scribed by Neris Mora in my presence, and it is both accurate and complete.    MEDICATIONS:  Current Outpatient Prescriptions   Medication Sig Dispense Refill     calcium-vitamin D (CALCIUM-VITAMIN D) 500 mg(1,250mg) -200 unit per tablet Take 1-2 tablets by mouth daily as needed. 60 tablet 4     medroxyPROGESTERone (DEPO-PROVERA) 150 mg/mL injection Inject 150 mg into the shoulder, thigh, or buttocks every 3 (three) months.       prenatal vit,estrada 74-iron-folic 27 mg iron- 1 mg Tab Take 1 tablet by mouth daily. 90 tablet 4     No current facility-administered medications for this visit.        Total data points: 2

## 2021-06-17 NOTE — PATIENT INSTRUCTIONS - HE
Patient Instructions by Gissell Ruggiero PT at 2/14/2019 12:30 PM     Author: Gissell Ruggiero PT Service: -- Author Type: Physical Therapist    Filed: 2/14/2019  1:41 PM Encounter Date: 2/14/2019 Status: Signed    : Gissell Ruggiero PT (Physical Therapist)        CHIN TUCK - SUPINE    While lying on your back, tuck your chin towards your chest and press the back of your head into the table.    Maintain contact of head with the surface you are lying on the entire time.  WITH PILLOW  x3-5 seconds x5-10 reps 2-4x/day    RETRACTION / CHIN TUCK    Slowly draw your head back so that your ears line up with your shoulders.  x3-5 seconds x5-10 reps 2-4x/day      SCAPULAR RETRACTIONS    Draw your shoulder blades back and down.  Hold x3-5 seconds x5-10 reps   2-4x/day

## 2021-06-17 NOTE — PATIENT INSTRUCTIONS - HE
Patient Instructions by Gissell Ruggiero PT at 3/7/2019 12:30 PM     Author: Gissell Ruggiero PT Service: -- Author Type: Physical Therapist    Filed: 3/7/2019  1:07 PM Encounter Date: 3/7/2019 Status: Signed    : Gissell Ruggiero PT (Physical Therapist)       UPPER BACK STRENGTHENING    Pull back x10 reps  1-2x          Thoracic Extension over Chair    Find a chair so that the back hits mid shoulder blade  Clasp hands behind neck  Lean backwards to feel arch in mid spine  Over edge of table  x5-10 seconds 1-2x       Pull intermediate up x5-10 reps  1-2x    NECK STRENGTHENING   CHIN TUCK - SUPINE    While lying on your back, tuck your chin towards your chest and press the back of your head into the table.    Maintain contact of head with the surface you are lying on the entire time.  WITH PILLOW  x5-19 seconds x5-10 reps 2-4x/day    RETRACTION / CHIN TUCK    Slowly draw your head back so that your ears line up with your shoulders.  x3-5 seconds x5-10 reps 2-4x/day      ISOMETRIC SIDE BEND    Place your fingers on the side of your head and gently tilt your head to the side and into your fingers.  Hol x3-5 seconds x5-10 reps 1-2x/day          SCAPULAR RETRACTIONS    Draw your shoulder blades back and down.  Hold x3-5 seconds x5-10 reps   2-4x/day      CERVICAL SIDE BEND    Tilt your head towards the side, then return back to looking straight ahead.  (Be sure to keep you eyes and nose pointed straight ahead the entire time)  SMALL and feels good  x5-10 reps    CERVICAL ROTATION    Turn your head towards the side, then return back to looking straight ahead.  x5-10 reps         Lay on balls x10 seconds x10 reps 1-2x/day

## 2021-06-17 NOTE — PATIENT INSTRUCTIONS - HE
Patient Instructions by Gissell Ruggiero PT at 4/18/2019 11:30 AM     Author: Gissell Ruggiero PT Service: -- Author Type: Physical Therapist    Filed: 4/18/2019 11:58 AM Encounter Date: 4/18/2019 Status: Addendum    : Gissell Ruggiero PT (Physical Therapist)    Related Notes: Original Note by Gissell Ruggiero PT (Physical Therapist) filed at 4/18/2019 11:55 AM                 Thoracic Extension over Chair    Find a chair so that the back hits mid shoulder blade  Clasp hands behind neck  Lean backwards to feel arch in mid spine  Over edge of table  x5-10 seconds 1-2x       NECK STRENGTHENING     CHIN TUCK HEAD LIFT  Perform chin tuck and then raise head up 1-2 inches off surface.  Slowly lower head raise and then relax chin tuck.  Hold x5 seconds x5 reps 1-2 sets  1x/day     Then do x5 reps to each side for 5 seconds each on your back and on your stomach    Forehead on hands - chin tuck and head lift x5 seconds x5 reps 1-2 sets  1x/day  UPPER BACK STRENGTHENING    Lift chest and arms up to feel back muscles working - x10-15 reps 1-2 sets 1x/day    Pull back x10 reps  1-2x      Counter push up x10 reps 1-2 sets 1x/day  THROUGHOUT WORK DAY   RETRACTION / CHIN TUCK    Slowly draw your head back so that your ears line up with your shoulders.  x3-5 seconds - in between each client x3-5 reps      SCAPULAR RETRACTIONS    Draw your shoulder blades back and down.  Hold x3-5 seconds x1-2 reps  In between each client     IN MORNING and 1-2x/day   CERVICAL SIDE BEND    Tilt your head towards the side, then return back to looking straight ahead.  (Be sure to keep you eyes and nose pointed straight ahead the entire time)  SMALL and feels good  x5-10 reps    CERVICAL ROTATION    Turn your head towards the side, then return back to looking straight ahead.  x5-10 reps       FOR HEADACHE RELIEF    Lay on balls x10 seconds x10 reps 1-2x/day

## 2021-06-17 NOTE — PATIENT INSTRUCTIONS - HE
Patient Instructions by Gissell Ruggiero PT at 2/21/2019 11:00 AM     Author: Gissell Ruggiero PT Service: -- Author Type: Physical Therapist    Filed: 2/21/2019 11:33 AM Encounter Date: 2/21/2019 Status: Signed    : Gissell Ruggiero PT (Physical Therapist)        CHIN TUCK - SUPINE    While lying on your back, tuck your chin towards your chest and press the back of your head into the table.    Maintain contact of head with the surface you are lying on the entire time.  WITH PILLOW  x5-19 seconds x5-10 reps 2-4x/day    RETRACTION / CHIN TUCK    Slowly draw your head back so that your ears line up with your shoulders.  x3-5 seconds x5-10 reps 2-4x/day      SCAPULAR RETRACTIONS    Draw your shoulder blades back and down.  Hold x3-5 seconds x5-10 reps   2-4x/day      CERVICAL SIDE BEND    Tilt your head towards the side, then return back to looking straight ahead.  (Be sure to keep you eyes and nose pointed straight ahead the entire time)  SMALL and feels good  x5-10 reps    CERVICAL ROTATION    Turn your head towards the side, then return back to looking straight ahead.  x5-10 reps         Lay on balls x10 seconds x10 reps 1-2x/day

## 2021-06-17 NOTE — PATIENT INSTRUCTIONS - HE
Patient Instructions by Gissell Ruggiero PT at 3/14/2019 11:30 AM     Author: Gissell Ruggiero PT Service: -- Author Type: Physical Therapist    Filed: 3/14/2019 12:03 PM Encounter Date: 3/14/2019 Status: Signed    : Gissell Ruggiero PT (Physical Therapist)       UPPER BACK STRENGTHENING    Pull back x10 reps  1-2x          Thoracic Extension over Chair    Find a chair so that the back hits mid shoulder blade  Clasp hands behind neck  Lean backwards to feel arch in mid spine  Over edge of table  x5-10 seconds 1-2x       Pull USP up x5-10 reps  1-2x    NECK STRENGTHENING     CHIN TUCK HEAD LIFT  Perform chin tuck and then raise head up 1-2 inches off surface.  Slowly lower head raise and then relax chin tuck.  Hold x5 seconds x10 reps 1-2 sets  1x/day      ISOMETRIC SIDE BEND    Place your fingers on the side of your head and gently tilt your head to the side and into your fingers.  Hol x3-5 seconds x5-10 reps 1-2x/day       Forehead on hands - chin tuck and head lift x5 seconds x10 reps 1-2 sets  1x/day    THROUGHOUT WORK DAY   RETRACTION / CHIN TUCK    Slowly draw your head back so that your ears line up with your shoulders.  x3-5 seconds - in between each client x3-5 reps      SCAPULAR RETRACTIONS    Draw your shoulder blades back and down.  Hold x3-5 seconds x1-2 reps  In between each client     IN MORNING and 1-2x/day   CERVICAL SIDE BEND    Tilt your head towards the side, then return back to looking straight ahead.  (Be sure to keep you eyes and nose pointed straight ahead the entire time)  SMALL and feels good  x5-10 reps    CERVICAL ROTATION    Turn your head towards the side, then return back to looking straight ahead.  x5-10 reps       FOR HEADACHE RELIEF    Lay on balls x10 seconds x10 reps 1-2x/day          s/p fall, back fracture

## 2021-06-17 NOTE — PATIENT INSTRUCTIONS - HE
Patient Instructions by Gissell Ruggiero PT at 2/28/2019 12:30 PM     Author: Gissell Ruggiero PT Service: -- Author Type: Physical Therapist    Filed: 2/28/2019 12:59 PM Encounter Date: 2/28/2019 Status: Addendum    : Gissell Ruggiero PT (Physical Therapist)    Related Notes: Original Note by Gissell Ruggiero PT (Physical Therapist) filed at 2/28/2019 12:50 PM        CHIN TUCK - SUPINE    While lying on your back, tuck your chin towards your chest and press the back of your head into the table.    Maintain contact of head with the surface you are lying on the entire time.  WITH PILLOW  x5-19 seconds x5-10 reps 2-4x/day    RETRACTION / CHIN TUCK    Slowly draw your head back so that your ears line up with your shoulders.  x3-5 seconds x5-10 reps 2-4x/day      ISOMETRIC SIDE BEND    Place your fingers on the side of your head and gently tilt your head to the side and into your fingers.  Hol x3-5 seconds x5-10 reps 1-2x/day          SCAPULAR RETRACTIONS    Draw your shoulder blades back and down.  Hold x3-5 seconds x5-10 reps   2-4x/day      CERVICAL SIDE BEND    Tilt your head towards the side, then return back to looking straight ahead.  (Be sure to keep you eyes and nose pointed straight ahead the entire time)  SMALL and feels good  x5-10 reps    CERVICAL ROTATION    Turn your head towards the side, then return back to looking straight ahead.  x5-10 reps         Lay on balls x10 seconds x10 reps 1-2x/day

## 2021-06-17 NOTE — PATIENT INSTRUCTIONS - HE
Patient Instructions by Gissell Ruggiero PT at 3/28/2019 11:30 AM     Author: Gissell Ruggiero PT Service: -- Author Type: Physical Therapist    Filed: 3/28/2019 12:04 PM Encounter Date: 3/28/2019 Status: Signed    : Gissell Ruggiero PT (Physical Therapist)       UPPER BACK STRENGTHENING    Pull back x10 reps  1-2x          Thoracic Extension over Chair    Find a chair so that the back hits mid shoulder blade  Clasp hands behind neck  Lean backwards to feel arch in mid spine  Over edge of table  x5-10 seconds 1-2x       Pull senior living up x5-10 reps  1-2x    NECK STRENGTHENING     CHIN TUCK HEAD LIFT  Perform chin tuck and then raise head up 1-2 inches off surface.  Slowly lower head raise and then relax chin tuck.  Hold x5 seconds x5 reps 1-2 sets  1x/day     Then do x5 reps to each side for 5 seconds each on your back and on your stomach    Forehead on hands - chin tuck and head lift x5 seconds x5 reps 1-2 sets  1x/day    THROUGHOUT WORK DAY   RETRACTION / CHIN TUCK    Slowly draw your head back so that your ears line up with your shoulders.  x3-5 seconds - in between each client x3-5 reps      SCAPULAR RETRACTIONS    Draw your shoulder blades back and down.  Hold x3-5 seconds x1-2 reps  In between each client     IN MORNING and 1-2x/day   CERVICAL SIDE BEND    Tilt your head towards the side, then return back to looking straight ahead.  (Be sure to keep you eyes and nose pointed straight ahead the entire time)  SMALL and feels good  x5-10 reps    CERVICAL ROTATION    Turn your head towards the side, then return back to looking straight ahead.  x5-10 reps       FOR HEADACHE RELIEF    Lay on balls x10 seconds x10 reps 1-2x/day

## 2021-06-20 NOTE — PROGRESS NOTES
Assessment:      32 y.o., continuing Depo-Provera injections, no contraindications.  Considering Mirena IUD placement in November.    Plan:      All questions answered. considering IUD placement in November    Subjective:      Dayana Mazariegos is a 32 y.o. female who presents for contraception counseling. The patient has no complaints today. The patient is sexually active. Pertinent past medical history: none.    Menstrual History:  OB History      Para Term  AB Living    3 3 3   3    SAB TAB Ectopic Multiple Live Births       0 1           No LMP recorded. Patient has had an injection. Has had bleeding off and on for lat 3 weeks.        The following portions of the patient's history were reviewed and updated as appropriate: allergies, current medications and problem list.    Review of Systems  Pertinent items are noted in HPI.     Objective:      No exam performed today, Counseling for 25 min regarding symptoms of current birth control, symptom control with Rx for Ibuprofen 600 mg po four times a day prn, discussed other birth control options and completed IUD consult with professional  present.

## 2021-06-21 NOTE — LETTER
Letter by Darya Torres MD at      Author: Darya Torres MD Service: -- Author Type: --    Filed:  Encounter Date: 3/17/2021 Status: (Other)         Dayana Mazariegos  [address]  [facility] [address]             March 17, 2021         Dear Ms. Mazariegos,    Below are the results from your recent visit:    Resulted Orders   Lipid Cascade   Result Value Ref Range    Cholesterol 158 <=199 mg/dL    Triglycerides 62 <=149 mg/dL    HDL Cholesterol 45 (L) >=50 mg/dL    LDL Calculated 101 <=129 mg/dL    Patient Fasting > 8hrs? Yes    Glucose   Result Value Ref Range    Glucose 93 70 - 99 mg/dL    Patient Fasting > 8hrs? Yes     Narrative    Fasting Glucose reference range is 70-99 mg/dL per  American Diabetes Association (ADA) guidelines.   Thyroid Stimulating Hormone (TSH)   Result Value Ref Range    TSH 1.03 0.30 - 5.00 uIU/mL   T4, Free   Result Value Ref Range    Free T4 1.0 0.7 - 1.8 ng/dL   T3 (Triiodothyronine), Free   Result Value Ref Range    T3, Free 3.2 1.9 - 3.9 pg/mL   HM2(CBC w/o Differential)   Result Value Ref Range    WBC 7.0 4.0 - 11.0 thou/uL    RBC 5.05 3.80 - 5.40 mill/uL    Hemoglobin 15.0 12.0 - 16.0 g/dL    Hematocrit 44.4 35.0 - 47.0 %    MCV 88 80 - 100 fL    MCH 29.7 27.0 - 34.0 pg    MCHC 33.8 32.0 - 36.0 g/dL    RDW 12.6 11.0 - 14.5 %    Platelets 203 140 - 440 thou/uL    MPV 11.6 (H) 7.0 - 10.0 fL       Thank you for allowing me to be a part of your care. This note is to inform you that your results are stable. I will be glad to go over any results with you in details. Please don't hesitate to call the clinic. Have a happy, safe, and healthy year.      Please call with questions or contact us using [facility].    Sincerely,        Electronically signed by Darya Edwards MD

## 2021-06-23 NOTE — PATIENT INSTRUCTIONS - HE
1.  Schedule Nexplanon Insertion    2.  Schedule physical therapy for your neck and shoulder    3.  Call Dr. Fierro's office and schedule an appointment to see him for the carpal tunnel [phone]

## 2021-06-24 NOTE — PROGRESS NOTES
Dayana Mazariegos is a 33 y.o. female who is here for Nexplanon insertion.  She was seen a few weeks back for a consultation.  She was given patient handouts at that time.  LMP last week.  She is transitioning from depo provera    Procedure note:  Risk, benefits, and procedure were reviewed with the patient.  Alternatives were also offered.  Patient agreed to go ahead with the procedure.  Procedure techniques were reviewed with the patient.  Informed consent was obtained.  Urine HCG was negative in the clinic.  After cleansing the non-dominant arm above the elbow, 2cc of 1% lidocaine was administered.  Nexplanon was then inserted 8cm above the elbow crease.  Patient tolerated the procedure.  Palpation revealed subdermal placement.  Patient was able to feel the implant as well.  Bleeding was minimal and a pressure dressing was applied with instructions to leave this in place for 24-48 hours.  Patient was instructed to observe for signs and symptoms of any infection (localized tenderness, pain, inflammation) or excessive bruising.

## 2021-06-24 NOTE — PROGRESS NOTES
[facility] Daily Progress     Patient Name: Dayana Mazariegos  Date: 2019  Visit #:   Referring Provider: Darya Torres*  Referring Diagnosis:   Neck pain [M54.2]       Chronic pain of both shoulders [M25.511, G89.29, M25.512]          Visit Diagnosis:     ICD-10-CM    1. Chronic neck pain M54.2     G89.29    2. Chronic pain of both shoulders M25.511     G89.29     M25.512    3. Generalized muscle weakness M62.81    4. Decreased range of motion of shoulder, unspecified laterality M25.619    5. Decreased range of motion of neck R29.898    6. Bilateral hand numbness R20.0        Assessment:     Pt demo's moderate weakness with neck strength testing today without increase to neck and shoulder pain.    Patient is benefitting from skilled physical therapy and is making steady progress toward functional goals.  Patient is appropriate to continue with skilled physical therapy intervention, as indicated by initial plan of care.    Goal Status:  Pt. will demonstrate/verbalize independence in self-management of condition in : 12 weeks    Pt. will have improved quality of sleep: with less pain;waking less times/night;in 12 weeks - IMPROVING    Patient Turn Head: for driving;for conversation;with less pain;with less difficulty;in 12 weeks    Patient will reach / maintain arm movement: overhead;with less pain;with less difficulty;in 12 weeks    Plan / Patient Education:     Continue with initial plan of care.  Assess response to increasing length of hold with chin tuck and addition of sub-occipital release with tennis ball.  Attempt mechanical traction next visit to assess relief of UE sx, perform ULTT B UE.   Progress strength with cervical isometrics if able.      Subjective:     Pain Ratin  Pain worse today than last wee -pt recently got a birth control implant in her L arm that has been painful and itching x 2 days since she got it. Her MD said this reaction can last up to 10 days. Pt will let   MD know if after 10 days she is still having difficulty with it.  Pt reports she has a headache today but has been sleeping well.   Shoulder blade squeeze helps.    Objective:     Neck strength  CCFT - pt can reach 4,6 and 8 mmHg change and hold ~5 seconds, unable to hold x10 seconds or reach 10 and 12 mmHg change    Moderate B sub-occipital tone with tenderness    Treatment Today      TREATMENT MINUTES COMMENTS   Evaluation     Self-care/ Home management     Manual therapy     Neuromuscular Re-education     Therapeutic Activity     Therapeutic Exercises 25 Assessed neck strength with BP cuff with cranio-cervical flexion test and discussed results. Performed sub-occipital stretch with tennis balls in sock and educated on use for home program per pt instructions and printed for home. Added cervical rotation and SB ROM to home program per instructions as well.   Gait training     Modality__________________                Total 25    Blank areas are intentional and mean the treatment did not include these items.       Gissell Ruggiero PT, DPT, OCS, CLT  2/21/2019  11:08 AM

## 2021-06-24 NOTE — PROGRESS NOTES
[facility] Daily Progress     Patient Name: Dayana Mazariegos  Date: 3/14/2019  Visit #:   Referring Provider: Darya Torres*  Referring Diagnosis:   Neck pain [M54.2]       Chronic pain of both shoulders [M25.511, G89.29, M25.512]          Visit Diagnosis:     ICD-10-CM    1. Chronic neck pain M54.2     G89.29    2. Chronic pain of both shoulders M25.511     G89.29     M25.512    3. Generalized muscle weakness M62.81    4. Decreased range of motion of shoulder, unspecified laterality M25.619    5. Decreased range of motion of neck R29.898    6. Bilateral hand numbness R20.0        Assessment:     Pt reports decrease to headaches, shoulder blade pain intensity and improved sleep with HEP.    Patient is benefitting from skilled physical therapy and is making steady progress toward functional goals.  Patient is appropriate to continue with skilled physical therapy intervention, as indicated by initial plan of care.    Goal Status:  Pt. will demonstrate/verbalize independence in self-management of condition in : 12 weeks - IMPROVING    Pt. will have improved quality of sleep: with less pain;waking less times/night;in 12 weeks - IMPROVING    Patient Turn Head: for driving;for conversation;with less pain;with less difficulty;in 12 weeks - IMPROVING    Patient will reach / maintain arm movement: overhead;with less pain;with less difficulty;in 12 weeks - IMPROVING    Plan / Patient Education:     Continue with initial plan of care.  Assess response to rows and thoracic ext and cervical traction and continue if beneficial. Assess shoulder raise ability and B ULTT.    Subjective:     Pain Ratin/10 - feeling really good with HEP. Pain still bothers in the morning when she wakes up and can be tired with working and come back but the exercises and give short term relief of the pain so far.    Headaches and sleeping still better.    Objective:     Supine chin tuck head lift - pt tires after 4 reps but  can finish 10 reps with moderate to significant fatigue    Prone chin tuck head lift feels good and minimal tiring    Neither new exercise bothers pain or headache    Continued relief of neck pain after traction    FROM LAST VISITS  Neck strength  CCFT - pt can reach 4,6 and 8 mmHg change and hold ~5 seconds, unable to hold x10 seconds or reach 10 and 12 mmHg change    Moderate B sub-occipital tone with tenderness    Treatment Today      TREATMENT MINUTES COMMENTS   Evaluation     Self-care/ Home management     Manual therapy 14 Performed cervical traction with [facility] home [facility] and monitored pt's sx at 10-20 lbs static x12' + 2' set up.   Neuromuscular Re-education     Therapeutic Activity     Therapeutic Exercises 24 Performed and added to HEP per pt instructions and printed for home. Discussed morning stretching to assist with morning pain and stiffness.   Gait training     Modality__________________                Total 38    Blank areas are intentional and mean the treatment did not include these items.       Gissell Ruggiero PT, DPT, OCS, CLT  3/14/2019  11:35 AM

## 2021-06-24 NOTE — PROGRESS NOTES
[facility]   Initial Evaluation    Patient Name: Dayana Mazariegos  Date of evaluation: 2/14/2019  Visit number: 1/12  Referring Provider: Darya Torres*  Referring Diagnosis:   Neck pain [M54.2]       Chronic pain of both shoulders [M25.511, G89.29, M25.512]         Visit Diagnosis:     ICD-10-CM    1. Chronic neck pain M54.2     G89.29    2. Chronic pain of both shoulders M25.511     G89.29     M25.512    3. Generalized muscle weakness M62.81    4. Decreased range of motion of shoulder, unspecified laterality M25.619    5. Decreased range of motion of neck R29.898    6. Bilateral hand numbness R20.0        Assessment:        Dayana Mazariegos is a 33 y.o. female who presents to therapy today with chief complaints of chronic neck and shoulder pain with B hand numbness. Onset date of sx was ~2 years ago when she was pregnant with her 3rd child.  Pt reported h/o hand numbness with her 1st pregnancy that was 12 years ago but sx resolved after pt delivered.  Pain symptoms are moderate to significant in neck and B shoulders and don't change much.  Functional impairments include difficulty and pain with most ADLs involving raising her arms over head including dressing, headaches, moving her head and neck for conversation and driving, sleeping and dependent care of her 22 month old son.  Pt demo's signs and sx consistent with decreased neck and shoulder ROM with muscle weakness.     Pt. is appropriate for skilled PT intervention as outlined in the Plan of Care (POC).  Pt. is a good candidate for skilled PT services to improve pain levels and function.    Goals:  Pt. will demonstrate/verbalize independence in self-management of condition in : 12 weeks  Pt. will have improved quality of sleep: with less pain;waking less times/night;in 12 weeks  Patient Turn Head: for driving;for conversation;with less pain;with less difficulty;in 12 weeks  Patient will reach / maintain arm movement: overhead;with less pain;with  less difficulty;in 12 weeks    Patient's expectations/goals are realistic.    Barriers to Learning or Achieving Goals:  Chronicity of the problem.       Plan / Patient Instructions:      Plan for next visit: Assess response to chin tuck and scap retraction.  Assess cervical compression/distraction and ULTT.  Attempt thoracic mobs, STM and strengthening upper back and shoulders.    Plan of Care:   Communication with: Referral Source  Patient Related Instruction: Nature of Condition;Treatment plan and rationale;Self Care instruction;Basis of treatment;Body mechanics;Posture;Precautions;Next steps;Expected outcome  Times per Week: 1  Number of Weeks: 12  Number of Visits: 12  Discharge Planning: when indicated  Precautions / Restrictions : none  Therapeutic Exercise: ROM;Stretching;Strengthening  Neuromuscular Reeducation: posture;TNE;core;other  Neuromuscular Re-education: neurodynamics  Manual Therapy: soft tissue mobilization;joint mobilization;muscle energy  Functional Training (ADL's): self care;ADL's    Treatment techniques, plan of care, and goals were discussed with the patient.  The patient agrees to the plan as outlined.  The plan of care is dynamic and will be modified on an ongoing basis.       Subjective:       Social information:   Occupation:   Work Status:NA    History of Present Illness:  Pt reports having B hand numbness with 1st and 3rd pregnancies. Pt reports having a 13 yo, 7 yo and 22 month old. Pt had numbness in her R hand with her first pregnancy and then it went away after baby was born. Then pt had no difficulties with her second pregnancy but with third pregnancy pt had return of numbness both hands and pain in neck and shoulders with headaches.  Pt reports taking tylenol and advil and they help some. Pt does get some relief when she takes this for sleeping. Pt reports she wakes up ~2x/night due to pain.     Pt reports the muscle relaxer felt helpful but only temporarily when  she was taking it.    Pt reports increased pain with changing her clothes and raising her arms over head for ADLs. Pt also has pain with lifting her 22 month old. Pt has pain with moving her head around for conversation and driving too.     Pt reports finishing breast feeding 6 months ago and has her menstrual cycle back.    Pt also reports L 1st toe pain but wants to work on her neck and shoulders first.    Pain Ratin  Pain rating at best: 5  Pain rating at worst: 10  Pain description: aching and pain    Patient reports benefit from:  rest  , pain medication       Objective:      Patient Outcome Measures :    No Data Recorded   Scores range from 0-100%, where a score of 0% represents minimal pain and maximal function. The minmal clinically important difference is a score reduction of 10%.    Precautions/Restrictions: None  Involved side: Bilateral  Posture Observation:      General sitting posture is  fair with crossed posture pattern upper body - rounded shoulders, forward head, scapular protraction, thoracic kyphosis.    Gait: WNL    Palpation: Significant tone with moderate tenderness B sub-occ, cervical paraspinals, upper trap, lev scap.    ROM: Neck flexion mod limited with neck pain, ext min limited with min pain, B rotation WNL but painful, B side bend min limited with central neck pain    Shoulder flexion B ~135   with pain in shoulders and neck, B abd ~150   with pain    Strength: B UE grossly 4+/5 except B shoulder flex and abd 3+/5 with pain    Sensation: B fingers intact to light touch today    Treatment Today      TREATMENT MINUTES COMMENTS   Evaluation 30 Neck/B shoulders   Self-care/ Home management     Manual therapy     Neuromuscular Re-education     Therapeutic Activity     Therapeutic Exercises 24 Discussed eval findings and POC. Pt educated on potential sources of sx. HEP instruction per Patient Instructions with written handout given.    Gait training     Modality__________________                 Total 54    Blank areas are intentional and mean the treatment did not include these items.        PT Evaluation Code: (Please list factors)  Patient History/Comorbidities: h/o pain x2 years and 12 years ago  Examination: neck B shoulder  Clinical Presentation: stable  Clinical Decision Making: low    Patient History/  Comorbidities Examination  (body structures and functions, activity limitations, and/or participation restrictions) Clinical Presentation Clinical Decision Making (Complexity)   No documented Comorbidities or personal factors 1-2 Elements Stable and/or uncomplicated Low   1-2 documented comorbidities or personal factor 3 Elements Evolving clinical presentation with changing characteristics Moderate   3-4 documented comorbidities or personal factors 4 or more Unstable and unpredictable High       Gissell Ruggiero PT, DPT, OCS, CLT  2/14/2019  12:40 PM

## 2021-06-24 NOTE — PROGRESS NOTES
[facility] Daily Progress     Patient Name: Dayana Mazariegos  Date: 2019  Visit #: 3/12  Referring Provider: Darya Torres*  Referring Diagnosis:   Neck pain [M54.2]       Chronic pain of both shoulders [M25.511, G89.29, M25.512]          Visit Diagnosis:     ICD-10-CM    1. Chronic neck pain M54.2     G89.29    2. Chronic pain of both shoulders M25.511     G89.29     M25.512    3. Generalized muscle weakness M62.81    4. Decreased range of motion of shoulder, unspecified laterality M25.619    5. Decreased range of motion of neck R29.898    6. Bilateral hand numbness R20.0        Assessment:     Pt reports decrease to headaches, pain intensity and improved sleep with HEP.    Patient is benefitting from skilled physical therapy and is making steady progress toward functional goals.  Patient is appropriate to continue with skilled physical therapy intervention, as indicated by initial plan of care.    Goal Status:  Pt. will demonstrate/verbalize independence in self-management of condition in : 12 weeks    Pt. will have improved quality of sleep: with less pain;waking less times/night;in 12 weeks - IMPROVING    Patient Turn Head: for driving;for conversation;with less pain;with less difficulty;in 12 weeks    Patient will reach / maintain arm movement: overhead;with less pain;with less difficulty;in 12 weeks - IMPROVING    Plan / Patient Education:     Continue with initial plan of care.  Assess response to cervical traction and continue if beneficial. Assess B ULTT.  Attempt rows and resisted cervical retraction.    Subjective:     Pain Ratin-7/10 - better than last week (was 8/10)  Pt reports neck and shoulders still feel tired and sore but no headache today and headaches have been better. Pt reports sleeping has been better too.    Pt's L arm is better since the birth control injection last week has calmed down.    Pt reports exercises feel good to go and that they help stretch things out  but pain can come back.  Pt also can raise her arms up better but still tired.    Objective:     Pt reports relief of neck and shoulder tightness after traction - continued pain mid shoulder blades    Cervical ROM B rotation WNL without pain, B SB min limited with min pain    FROM LAST VISITS  Neck strength  CCFT - pt can reach 4,6 and 8 mmHg change and hold ~5 seconds, unable to hold x10 seconds or reach 10 and 12 mmHg change    Moderate B sub-occipital tone with tenderness    Treatment Today      TREATMENT MINUTES COMMENTS   Evaluation     Self-care/ Home management     Manual therapy 15 Attempt cervical traction with [facility] and monitored pt's sx at 10-20 lbs static x12' + 3' set up.   Neuromuscular Re-education     Therapeutic Activity     Therapeutic Exercises 8 Reassessed pt's cervical ROM. Added cervical SB isometrics to HEP.   Gait training     Modality__________________                Total 23    Blank areas are intentional and mean the treatment did not include these items.       Gissell Ruggiero PT, DPT, OCS, CLT  2/28/2019  11:08 AM

## 2021-06-24 NOTE — PROGRESS NOTES
[facility] Daily Progress     Patient Name: Dayana Mazariegos  Date: 3/7/2019  Visit #:   Referring Provider: Darya Torres*  Referring Diagnosis:   Neck pain [M54.2]       Chronic pain of both shoulders [M25.511, G89.29, M25.512]          Visit Diagnosis:     ICD-10-CM    1. Chronic neck pain M54.2     G89.29    2. Chronic pain of both shoulders M25.511     G89.29     M25.512    3. Generalized muscle weakness M62.81    4. Decreased range of motion of shoulder, unspecified laterality M25.619    5. Decreased range of motion of neck R29.898    6. Bilateral hand numbness R20.0        Assessment:     Pt reports decrease to headaches, pain intensity and improved sleep with HEP.  Pt continues to have steady pain in between shoulder blades.    Patient is benefitting from skilled physical therapy and is making steady progress toward functional goals.  Patient is appropriate to continue with skilled physical therapy intervention, as indicated by initial plan of care.    Goal Status:  Pt. will demonstrate/verbalize independence in self-management of condition in : 12 weeks    Pt. will have improved quality of sleep: with less pain;waking less times/night;in 12 weeks - IMPROVING    Patient Turn Head: for driving;for conversation;with less pain;with less difficulty;in 12 weeks    Patient will reach / maintain arm movement: overhead;with less pain;with less difficulty;in 12 weeks - IMPROVING    Plan / Patient Education:     Continue with initial plan of care.  Assess response to rows and thoracic ext and cervical traction and continue if beneficial. Assess B ULTT.  Attempt resisted cervical retraction.    Subjective:     Pain Ratin-7/10 - better than last week (was 8/10)  Headaches and sleeping still better but pain in between shoulder blades still bothers.    Pt reports exercises feel good to go and that they help stretch things out but pain can come back.  Pt also can raise her arms up better but still  tired when she has to raise both together.    Objective:     Relief of thoracic pain with seated thoracic extension    Increased AC jt pain with bent arm rows but no AC jt pain with straight arm rows    Pt reports relief of neck and shoulder tightness after traction     FROM LAST VISITS  Neck strength  CCFT - pt can reach 4,6 and 8 mmHg change and hold ~5 seconds, unable to hold x10 seconds or reach 10 and 12 mmHg change    Moderate B sub-occipital tone with tenderness    Treatment Today      TREATMENT MINUTES COMMENTS   Evaluation     Self-care/ Home management     Manual therapy 13 Performed cervical traction with [facility] and monitored pt's sx at 10-20 lbs static x10' + 3' set up.   Neuromuscular Re-education     Therapeutic Activity     Therapeutic Exercises 15 Performed and added to HEP per pt instructions and printed for home.   Gait training     Modality__________________                Total 28    Blank areas are intentional and mean the treatment did not include these items.       Gisslel Ruggiero PT, DPT, OCS, CLT  3/7/2019  5:11 PM

## 2021-06-27 NOTE — PROGRESS NOTES
Progress Notes by Darya Torres MD at 1/28/2019  3:40 PM     Author: Darya Torres MD Service: -- Author Type: Physician    Filed: 1/28/2019  4:25 PM Encounter Date: 1/28/2019 Status: Signed    : Darya Torres MD (Physician)       FEMALE PREVENTATIVE EXAM    Assessment and Plan:     Routine general medical examination at a health care facility  We discussed healthy lifestyle, nutrition, cardiovascular risk reduction, self care, safety, sunscreen, seatbelt, and timing of cancer screening.  Health maintenance screening and immunizations reviewed with the patient.    Encounter for contraceptive management, unspecified type  Spoke about various forms of birth control from oral, transdermal, intravaginal, intramuscular, subdermal, intrauterine.  Also spoke about hormonal versus nonhormonal form of birth control.  Patient verbalized interest in the Nexplanon.  We spoke about the procedure, risks and benefit, and timing of insertion.  Patient verbalized understanding and agree with plan    Chronic Neck pain and both shoulders  Occupation related ().  Discussed ergonomics.  OTC analgesics.  Massage prn.  -     Ambulatory referral to Physical Therapy    Carpal tunnel, bilateral  Has been following with [facility]. [facility]  Not getting better; she will make a f/u appointment    Next follow up:  One year for annual    Immunization Review  Adult Imm Review: No immunizations due today    I discussed the following with the patient:   Adult Healthy Living: Importance of regular exercise  Healthy nutrition  Getting adequate sleep  Stress management  Sunscreen    Subjective:   Chief Complaint: Dayana Mazariegos is an 33 y.o. female here for a preventative health visit.     HPI: Patient would like to start a different kind of birth control. The last time she had a Depo injection was over 3 months ago because insurance would no longer cover the cost. She is  interested in the Nexplanon implant. Pt has noticed that her period has lasted longer after having a child almost 2 years ago. Birth control options and side effects are discussed.     Patient has HA's often and chronic muscle tension. She went to physical therapy for numbness of her hands present for 2 years. Her neck and shoulders are constantly very tight. She was diagnosed with carpal tunnel in her R hand and was given an injection with little to no improvement. Pt is a  and is constantly leaning over during work hours. Denies breast lumps, issues with BMs, or urinary symptoms. Patient is not feeling down or depressed.   PHQ-2 Total Score: 0 (1/28/2019  3:00 PM)    Healthy Habits  Are you taking a daily aspirin? No  Do you typically exercising at least 40 min, 3-4 times per week?  NO  Do you usually eat at least 4 servings of fruit and vegetables a day, include whole grains and fiber and avoid regularly eating high fat foods? Yes  Have you had an eye exam in the past two years? Yes  Do you see a dentist twice per year? NO  Do you have any concerns regarding sleep? YES    Safety Screen  If you own firearms, are they secured in a locked gun cabinet or with trigger locks? The patient does not own any firearms  Do you feel you are safe where you are living?: Yes (1/28/2019  3:54 PM)  Do you feel you are safe in your relationship(s)?: Yes (1/28/2019  3:54 PM)      Review of Systems:  Please see above.  The rest of the review of systems are negative for all systems.     PAP History:  Cancer Screening       Status Date      PAP SMEAR Next Due 6/8/2021      Done 6/8/2016 GYNECOLOGIC CYTOLOGY (PAP SMEAR)     Patient has more history with this topic...        Patient Care Team:  Dayra Torres MD as PCP - General    History     Reviewed By Date/Time Sections Reviewed    Caitlyn Valencia CMA 1/28/2019  3:52 PM Tobacco        Objective:   Vital Signs:   Visit Vitals  /70 (Patient Site:  "Left Arm, Patient Position: Sitting, Cuff Size: Adult Regular)   Pulse 76   Ht 5' 2\" (1.575 m)   Wt 121 lb 7 oz (55.1 kg)   BMI 22.21 kg/m         PHYSICAL EXAM    Objective:    Physical Exam   Vitals:    01/28/19 1552   BP: 100/70   Pulse: 76      Constitutional: Patient is oriented to person, place, and time. Patient appears well-developed and well-nourished. No distress.   Head: Normocephalic and atraumatic.   Right Ear: External ear normal. Normal TM  Left Ear: External ear normal. Normal TM  Nose: Nose normal.   Mouth/Throat: Oropharynx is clear and moist. No oropharyngeal exudate.   Eyes: Conjunctivae and EOM are normal. Pupils are equal, round, and reactive to light. Right eye exhibits no discharge. Left eye exhibits no discharge. No scleral icterus.   Neck: Neck supple. No JVD present. No tracheal deviation present. No thyromegaly present.   Cardiovascular: Normal rate, regular rhythm, normal heart sounds and intact distal pulses. No murmur heard.   Pulmonary/Chest: Effort normal and breath sounds normal. No stridor. No respiratory distress. Patient has no wheezes, no rales, exhibits no tenderness.   Abdominal: Soft. Bowel sounds are normal. Patient exhibits no distension and no mass. There is no tenderness. There is no rebound and no guarding.   Lymphadenopathy:  Patient has no cervical adenopathy.   Neurological: Patient is alert and oriented to person, place, and time. Patient has normal reflexes. No cranial nerve deficit. Coordination normal.   Skin: Skin is warm and dry. No rash noted. Patient is not diaphoretic. No erythema. No pallor.   Normal breast exam       Medication List           Accurate as of 1/28/19  4:22 PM. If you have any questions, ask your nurse or doctor.               CONTINUE taking these medications    calcium-vitamin D 500 mg(1,250mg) -200 unit per tablet  Also known as:  calcium-vitamin D  INSTRUCTIONS:  Take 1-2 tablets by mouth daily as needed.        ibuprofen 600 MG " tablet  Also known as:  ADVIL,MOTRIN  INSTRUCTIONS:  Take 1 tablet (600 mg total) by mouth every 6 (six) hours as needed for pain.        prenatal vit,estrada 74-iron-folic 27 mg iron- 1 mg Tab  INSTRUCTIONS:  Take 1 tablet by mouth daily.           STOP taking these medications    medroxyPROGESTERone 150 mg/mL injection  Also known as:  DEPO-PROVERA  Stopped by:  Darya Edwards MD          Additional Screenings Completed Today:     ADDITIONAL HISTORY SUMMARIZED (2): Physical therapy note reviewed from 1/7/2018.  DECISION TO OBTAIN EXTRA INFORMATION (1): None.   RADIOLOGY TESTS (1): None.  LABS (1): None.  MEDICINE TESTS (1): None.  INDEPENDENT REVIEW (2 each): None.     Total data points = 2    I spent a total time of 16 minutes additional to the preventive, greater than 50% counseling and coordinating care regarding annual physical.    By signing my name below, I, Miley Baird, attest that this documentation has been prepared under the direction and in the presence of Dr. Cassie Connor.  Electronic Signature: Khang Argueta. 01/28/2019 16:00.    I, Dr. Darya Edwards MD , personally performed the services described in this documentation. All medical record entries made by the scribe were at my direction and in my presence. I have reviewed the chart and discharge instructions (if applicable) and agree that the record reflects my personal performance and is accurate and complete.

## 2021-06-30 NOTE — PROGRESS NOTES
"Progress Notes by Darya Torres MD at 3/16/2021  9:20 AM     Author: Darya Torres MD Service: -- Author Type: Physician    Filed: 3/16/2021  9:47 AM Encounter Date: 3/16/2021 Status: Signed    : Darya Torres MD (Physician)       FEMALE PREVENTATIVE EXAM    Assessment and Plan:     Patient has been advised of split billing requirements and indicates understanding: Yes    Dayana was seen today for annual exam.    Routine general medical examination at a health care facility  -     Lipid Cascade  -     Glucose  -     HM2(CBC w/o Differential)  We discussed healthy lifestyle, nutrition, cardiovascular risk reduction, self care, safety, sunscreen, and timing of cancer screening.  Health maintenance screening and immunizations reviewed with the patient.  Follow up yearly for the annual physical.  CBC screen due to mom's history of \"blood cancer\"  She declined pap smear today    Cold intolerance  -     Thyroid Stimulating Hormone (TSH)  -     T4, Free  -     T3 (Triiodothyronine), Free    Immunization Review  Adult Imm Review: No immunizations due today    I discussed the following with the patient:   Adult Healthy Living: Importance of regular exercise  Healthy nutrition  Getting adequate sleep  Stress management    Subjective:   Chief Complaint: Dayana Mazariegos is an 35 y.o. female here for a preventative health visit.    Patient has been advised of split billing requirements and indicates understanding: Yes  HPI:    Cold intolerance   Irregular menses due to nexplanon  No FMH of thyroid  Mom  of \"blood cancer\"    Healthy Habits  Are you taking a daily aspirin? No  Do you typically exercising at least 40 min, 3-4 times per week?  NO  Do you usually eat at least 4 servings of fruit and vegetables a day, include whole grains and fiber and avoid regularly eating high fat foods? Yes  Have you had an eye exam in the past two years? NO  Do you see a dentist twice per " "year? Yes  Do you have any concerns regarding sleep? No    Safety Screen  If you own firearms, are they secured in a locked gun cabinet or with trigger locks? The patient does not own any firearms  No data recorded    Review of Systems:  Please see above.  The rest of the review of systems are negative for all systems.     Pap History:   Yes - updated in Problem List and Health Maintenance accordingly  Cancer Screening       Status Date      PAP SMEAR Next Due 6/8/2021      Done 6/8/2016 GYNECOLOGIC CYTOLOGY (PAP SMEAR)     Patient has more history with this topic...          Patient Care Team:  Darya Torres MD as PCP - General  Darya Torres MD as Assigned PCP        History     Reviewed By Date/Time Sections Reviewed    Caitlyn Valencia CMA 3/16/2021  9:32 AM Tobacco            Objective:   Vital Signs:   Visit Vitals  BP 98/64 (Patient Site: Right Arm, Patient Position: Sitting, Cuff Size: Adult Regular)   Pulse 74   Ht 5' 2\" (1.575 m)   Wt 124 lb (56.2 kg)   SpO2 96%   BMI 22.68 kg/m           PHYSICAL EXAM    Objective:    Physical Exam   Vitals:    03/16/21 0932   BP: 98/64   Pulse: 74   SpO2: 96%      Constitutional: Patient is oriented to person, place, and time. Patient appears well-developed and well-nourished. No distress.   Head: Normocephalic and atraumatic.   Right Ear: External ear normal. Normal TM  Left Ear: External ear normal. Normal TM  Eyes: Conjunctivae and EOM are normal. Pupils are equal, round, and reactive to light. Right eye exhibits no discharge. Left eye exhibits no discharge. No scleral icterus.   Neck: Neck supple. No JVD present. No tracheal deviation present. No thyromegaly present.   Cardiovascular: Normal rate, regular rhythm, normal heart sounds and intact distal pulses. No murmur heard.   Pulmonary/Chest: Effort normal and breath sounds normal. No stridor. No respiratory distress. Patient has no wheezes, no rales, exhibits no tenderness. "   Abdominal: Soft. Patient exhibits no distension and no mass. There is no tenderness. There is no rebound and no guarding.   Lymphadenopathy:  Patient has no cervical adenopathy.   Neurological: Patient is alert and oriented to person, place, and time. No cranial nerve deficit. Coordination normal.   Skin: Skin is warm and dry. No rash noted. Patient is not diaphoretic. No erythema. No pallor.   Normal breast exam           Medication List          Accurate as of March 16, 2021  9:46 AM. If you have any questions, ask your nurse or doctor.            CONTINUE taking these medications    Nexplanon 68 mg Impl implant  INSTRUCTIONS: 1 each by Subdermal route once. Inserted  2/18/2019  Generic drug: etonogestreL           STOP taking these medications    calcium-vitamin D 500 mg(1,250mg) -200 unit per tablet  Also known as: calcium-vitamin D  Stopped by: Darya Edwards MD     ibuprofen 600 MG tablet  Also known as: ADVIL,MOTRIN  Stopped by: Darya Edwards MD     prenatal vit,estrada 74-iron-folic 27 mg iron- 1 mg Tab  Stopped by: Darya Edwards MD            Additional Screenings Completed Today:

## 2021-07-14 PROBLEM — Z37.9 NORMAL LABOR: Status: RESOLVED | Noted: 2017-05-09 | Resolved: 2017-05-09

## 2021-07-14 PROBLEM — Z34.90 PREGNANT: Status: RESOLVED | Noted: 2017-05-09 | Resolved: 2017-05-09

## 2021-07-14 PROBLEM — G56.00 PREGNANCY RELATED CARPAL TUNNEL SYNDROME, ANTEPARTUM: Status: RESOLVED | Noted: 2017-03-07 | Resolved: 2017-06-20

## 2021-07-14 PROBLEM — Z34.80 ENCOUNTER FOR SUPERVISION OF OTHER NORMAL PREGNANCY: Status: RESOLVED | Noted: 2017-03-21 | Resolved: 2017-05-09

## 2021-07-14 PROBLEM — O99.350 PREGNANCY RELATED CARPAL TUNNEL SYNDROME, ANTEPARTUM: Status: RESOLVED | Noted: 2017-03-07 | Resolved: 2017-06-20

## 2021-11-17 ENCOUNTER — OFFICE VISIT (OUTPATIENT)
Dept: MIDWIFE SERVICES | Facility: CLINIC | Age: 36
End: 2021-11-17
Payer: COMMERCIAL

## 2021-11-17 VITALS
WEIGHT: 124.2 LBS | HEIGHT: 62 IN | DIASTOLIC BLOOD PRESSURE: 60 MMHG | HEART RATE: 76 BPM | BODY MASS INDEX: 22.86 KG/M2 | SYSTOLIC BLOOD PRESSURE: 96 MMHG

## 2021-11-17 DIAGNOSIS — Z30.09 ENCOUNTER FOR OTHER GENERAL COUNSELING OR ADVICE ON CONTRACEPTION: ICD-10-CM

## 2021-11-17 DIAGNOSIS — R19.8 PAIN WITH BOWEL MOVEMENTS: ICD-10-CM

## 2021-11-17 DIAGNOSIS — Z23 NEED FOR PROPHYLACTIC VACCINATION AND INOCULATION AGAINST INFLUENZA: Primary | ICD-10-CM

## 2021-11-17 DIAGNOSIS — K64.4 EXTERNAL HEMORRHOIDS: ICD-10-CM

## 2021-11-17 DIAGNOSIS — Z11.3 ENCOUNTER FOR SCREENING EXAMINATION FOR SEXUALLY TRANSMITTED DISEASE: ICD-10-CM

## 2021-11-17 DIAGNOSIS — Z12.4 ENCOUNTER FOR PAPANICOLAOU SMEAR FOR CERVICAL CANCER SCREENING: ICD-10-CM

## 2021-11-17 DIAGNOSIS — Z00.00 HEALTH MAINTENANCE EXAMINATION: ICD-10-CM

## 2021-11-17 PROCEDURE — 36415 COLL VENOUS BLD VENIPUNCTURE: CPT | Performed by: ADVANCED PRACTICE MIDWIFE

## 2021-11-17 PROCEDURE — 86780 TREPONEMA PALLIDUM: CPT | Performed by: ADVANCED PRACTICE MIDWIFE

## 2021-11-17 PROCEDURE — 87340 HEPATITIS B SURFACE AG IA: CPT | Performed by: ADVANCED PRACTICE MIDWIFE

## 2021-11-17 PROCEDURE — 86803 HEPATITIS C AB TEST: CPT | Performed by: ADVANCED PRACTICE MIDWIFE

## 2021-11-17 PROCEDURE — 90471 IMMUNIZATION ADMIN: CPT | Mod: SL | Performed by: ADVANCED PRACTICE MIDWIFE

## 2021-11-17 PROCEDURE — G0123 SCREEN CERV/VAG THIN LAYER: HCPCS | Performed by: ADVANCED PRACTICE MIDWIFE

## 2021-11-17 PROCEDURE — 99204 OFFICE O/P NEW MOD 45 MIN: CPT | Mod: 25 | Performed by: ADVANCED PRACTICE MIDWIFE

## 2021-11-17 PROCEDURE — 87591 N.GONORRHOEAE DNA AMP PROB: CPT | Performed by: ADVANCED PRACTICE MIDWIFE

## 2021-11-17 PROCEDURE — 87491 CHLMYD TRACH DNA AMP PROBE: CPT | Performed by: ADVANCED PRACTICE MIDWIFE

## 2021-11-17 PROCEDURE — 90686 IIV4 VACC NO PRSV 0.5 ML IM: CPT | Mod: SL | Performed by: ADVANCED PRACTICE MIDWIFE

## 2021-11-17 PROCEDURE — 87624 HPV HI-RISK TYP POOLED RSLT: CPT | Performed by: ADVANCED PRACTICE MIDWIFE

## 2021-11-17 ASSESSMENT — MIFFLIN-ST. JEOR: SCORE: 1206.62

## 2021-11-17 NOTE — PROGRESS NOTES
Office Visit      *Visit accomplished with the help of a phone Czech       Subjective:    Dayana is a 36 year old female who last saw the [facility] >3 years ago.  She presents for the following reasons:    1. Is due for a pap smear.  Had an annual health maintenance exam less than a year ago but the GYN part of exam was not done.  Would like to just complete the gynecology part of an exam today.  2. Would like full STI testing today.  Is in a monogamous,  relationship.  3. Desires flu shot  4. Has nexplanon for birth control.  Was placed 2/18/19, and therefore due to be replaced or to choose a new method in early 2022.  Not sure what wants to use.  Having trouble with irregular bleeding on Nexplanon (sometimes spotting on/off throughout the whole month, other times bleeding infrequent.  Bothered especially by frequent bleeding times and need to wear a pad all the time).  Wants a long-term reliable method. Also worried about weight gain as had weight gain with Depo shot, and things had a little bit with Nexplanon too.  5. Struggling with pain during bowel movements.  Has been present since the birth of her last child in 2017.  Has hemorrhoids and thinks this is the reason for discomfort.  Has a history of constipation in early postpartum time, but this is resolved now and pain persists.  Even has pain during stooling with diarrhea/soft stools. Pain present as the anus stretches to let the stool out.  Doesn't think she has a fissure. Has not been evaluated by a colon/rectal surgeon for this.   6. Had 1 episode of low abdominal discomfort around the time of her October period.  Associate with a small amount of pink tinged vaginal discharge. Asking what this could have been.      Review of Systems:  Also a 12 point comprehensive review of systems was negative except as noted.         Objective:   Vitals:    Vitals:    11/17/21 1344   BP: 96/60   Pulse: 76   Weight: 56.3 kg (124 lb 3.2 oz)  "  Height: 1.575 m (5' 2\")       Breasts: deferred today as done less than 1 year ago at annual exam   Abdomen: Soft, nontender, no masses, negative CVAT.  External genitalia: Normal hair distribution, no lesions.  Urethral opening: Without lesions, or tenderness.   Bladder: Without masses, or tenderness.  Vagina: Pink, rugated, normal-appearing discharge.  Cervix: parous, pink, smooth, no lesions.  Pap smear / HPV and GC/CT obtained.   Bimanual: Finds uterus small, mobile, nontender, no masses.  Negative CMT.  Adnexa, without masses or tenderness.    Anus:  External hemorrhoids noted.  Do not appear inflamed.  No evidence of fissure.  When palpating area Dayana says external hemorrhoids not painful.  Gentle digital exam done and Dayana notes pain further inside when palpating about 2-3 cm inside.  States this internal pain consistent with pain she feels with bowel movements        Assessment/Plan:  1)  Normal genital/urinary exam.  Await pap / HPV.  2)  GC/CT cervical swab obtained.  Lab draw today for Hep B, Hep C, Syphilis, HIV.  Await results.    *Patient has asked for a phone call with  for results as doesn't have [facility].  Ok to wait for a single call until all labs are back.   3) Flu shot given  4) Long talk about the advantages/disadvantages, risks/benefits of Nexplanon, as well as other hormonal/non-hormonal birth control methods.  Talked about bleeding patterns, weight gain, ways methods work, birth control effectiveness, route and frequency they are taken, and need for procedure vs self-administration.  After long discussion Dayana feels she is leaning toward having Nexplanon removed and having an IUD placed instead.  Still deciding on Mirena vs ParaGard.  Would like this to be done in January.  Talked with [facility] schedulers about setting up a dual Nexplanon removal / IUD placement visit in January.  5) Hemorrhoids - Has struggled with them for years and has pain with each bowel movement.  On " assessment external hemorrhoids do not seem to be causing pain, rather something further inside is causing pain.  May have internal hemorrhoids vs fissure or other non-visible issue.  Patient feels she is ready to actively deal with this problem and is open to a surgical option to deal with the pain if this is recommended.  Referral made to colon/rectal surgeon for evaluation/treatment.  In meantime will adjust diet/hydration to avoid any constipation. Can use tub soak, OTC hemorrhoid creams for comfort.    6)  Normal abdominal and genital/urinary exam.  One time episode of abdominal discomfort and pink tinged vaginal discharge around time of October period.  No significant bleeding. STI and pap today.  No UTI symptoms.  No vaginal infections symptoms.  No bowel issue at the time.  Talked about how discomfort could have been GYN vs bowel vs  vs muscular, etc in nature.  Will continue to observe at this time and will return if it repeats.         Time spent:  Chart review/Pre-charting on 11/17/21: 4 minutes  Face-to-face visit: 40 minutes   Documentation:  10 minutes  Total time spent on day of service:  40-54 minutes

## 2021-11-18 LAB
HBV SURFACE AG SERPL QL IA: NONREACTIVE
HCV AB SERPL QL IA: NEGATIVE
T PALLIDUM AB SER QL: NONREACTIVE

## 2021-11-19 LAB
C TRACH DNA SPEC QL PROBE+SIG AMP: NEGATIVE
HUMAN PAPILLOMA VIRUS 16 DNA: NEGATIVE
HUMAN PAPILLOMA VIRUS 18 DNA: NEGATIVE
HUMAN PAPILLOMA VIRUS FINAL DIAGNOSIS: NORMAL
HUMAN PAPILLOMA VIRUS OTHER HR: NEGATIVE
N GONORRHOEA DNA SPEC QL NAA+PROBE: NEGATIVE

## 2021-11-26 LAB
BKR LAB AP GYN ADEQUACY: NORMAL
BKR LAB AP GYN INTERPRETATION: NORMAL
BKR LAB AP HPV REFLEX: NORMAL
BKR LAB AP LMP: NORMAL
BKR LAB AP PREVIOUS ABNORMAL: NORMAL
PATH REPORT.COMMENTS IMP SPEC: NORMAL
PATH REPORT.COMMENTS IMP SPEC: NORMAL
PATH REPORT.RELEVANT HX SPEC: NORMAL

## 2022-01-10 ENCOUNTER — OFFICE VISIT (OUTPATIENT)
Dept: MIDWIFE SERVICES | Facility: CLINIC | Age: 37
End: 2022-01-10
Payer: COMMERCIAL

## 2022-01-10 VITALS
HEIGHT: 62 IN | SYSTOLIC BLOOD PRESSURE: 88 MMHG | DIASTOLIC BLOOD PRESSURE: 68 MMHG | BODY MASS INDEX: 23 KG/M2 | WEIGHT: 125 LBS | OXYGEN SATURATION: 99 % | HEART RATE: 70 BPM

## 2022-01-10 DIAGNOSIS — Z30.46 ENCOUNTER FOR NEXPLANON REMOVAL: ICD-10-CM

## 2022-01-10 DIAGNOSIS — Z30.430 ENCOUNTER FOR INSERTION OF INTRAUTERINE CONTRACEPTIVE DEVICE: Primary | ICD-10-CM

## 2022-01-10 DIAGNOSIS — Z30.8 ENCOUNTER FOR OTHER CONTRACEPTIVE MANAGEMENT: ICD-10-CM

## 2022-01-10 PROCEDURE — 11982 REMOVE DRUG IMPLANT DEVICE: CPT | Performed by: ADVANCED PRACTICE MIDWIFE

## 2022-01-10 PROCEDURE — 58300 INSERT INTRAUTERINE DEVICE: CPT | Performed by: ADVANCED PRACTICE MIDWIFE

## 2022-01-10 ASSESSMENT — MIFFLIN-ST. JEOR: SCORE: 1210.25

## 2022-01-12 PROBLEM — Z30.8 ENCOUNTER FOR OTHER CONTRACEPTIVE MANAGEMENT: Status: ACTIVE | Noted: 2022-01-12

## 2022-01-12 RX ORDER — COPPER 313.4 MG/1
1 INTRAUTERINE DEVICE INTRAUTERINE ONCE
Status: COMPLETED
Start: 2022-01-12 | End: 2022-01-13

## 2022-01-12 RX ORDER — COPPER 313.4 MG/1
1 INTRAUTERINE DEVICE INTRAUTERINE ONCE
COMMUNITY

## 2022-01-12 NOTE — PROGRESS NOTES
Barbadian telephone  used for the duration of this medical visit.    Pt desires Nexplanon removal.  She has gained 10 pounds since it was placed.  Desires ParaGard IUD.      Nexplanon Removal:     Is a pregnancy test required: No.  Was a consent obtained?  Yes    Dayana Mazariegos is here for removal of etonogestrel implant Nexplanon/Implanon    Indication: Desires different contraception: ParaGard IUD      Preoperative Diagnosis: etonogestrel implant  Postoperative Diagnosis: etonogestrel implant removed    Technique: On the left arm  Skin prep Betadine  Anesthesia 1% lidocaine, with epi  Procedure: Small incision (<5mm) was made at distal end of palpable implant, curved hemostat or mosquito forceps was used to isolate the implant and bring it to the incision, the fibrous capsule containing the implant  was incised and the Implant was removed intact.    EBL: minimal  Complications:  No  Tolerance:  Pt tolerated procedure well and was in stable condition.   Dressing:    A pressure bandage was placed for the next 12-24 hours.    Contraception was discussed and patient chose the following method Parguard IUD      Follow up: Pt was instructed to call if bleeding, severe pain or foul smell.     Gabriela Pereira, ENRRIQUE    ___________________________________________________________________________________________    IUD Insertion:  CONSULT:    Is a pregnancy test required: No.  Was a consent obtained?  Yes    Subjective: Dayana Mazariegos is a 36 year old  presents for IUD and desires Paragard type IUD.    Patient has been given the opportunity to ask questions about all forms of birth control, including all options appropriate for Dayana Mazariegos. Discussed that no method of birth control, except abstinence is 100% effective against pregnancy or sexually transmitted infection.     Dayana Mazariegos understands she may have the IUD removed at any time. IUD should be removed by a health care provider.    The entire  "insertion procedure was reviewed with the patient, including care after placement.    No LMP recorded. No allergy to betadine or shellfish. Recent STD screening  hCG Urine Qualitative   Date Value Ref Range Status   02/18/2019 Negative Negative Final         BP (!) 88/68   Pulse 70   Ht 1.575 m (5' 2\")   Wt 56.7 kg (125 lb)   SpO2 99%   BMI 22.86 kg/m      Pelvic Exam:   EG/BUS: normal genital architecture without lesions, erythema or abnormal secretions.   Vagina: moist, pink, rugae with physiologic discharge and secretions  Cervix: parous no lesions and pink, moist, closed, without lesion or CMT  Uterus: Midline position, mobile, no pain  Adnexa: within normal limits and no masses, nodularity, tenderness    PROCEDURE NOTE: -- IUD Insertion    Reason for Insertion: contraception    Premedicated with ibuprofen.  Under sterile technique, cervix was visualized with speculum and prepped with Betadine solution swab x 3. Tenaculum was placed for stability. The uterus was gently straightened and sounded to 9.0 cm. IUD prepared for placement, and IUD inserted according to 's instructions without difficulty or significant resitance, and deployed at the fundus. The strings were visualized and trimmed to 3.0 cm from the external os. Tenaculum was removed and hemostasis noted. Speculum removed.  Patient tolerated procedure well.    Lot #[id number]   Exp: January 2027    EBL: minimal    Complications: none    ASSESSMENT:     ICD-10-CM    1. Encounter for insertion of intrauterine contraceptive device  Z30.430 paragard intrauterine copper device     paragard intrauterine copper IUD device 1 each     INSERTION INTRAUTERINE DEVICE   2. Encounter for Nexplanon removal  Z30.46 REMOVAL NEXPLANON   3. Encounter for other contraceptive management  Z30.8    4. BMI 22.0-22.9, adult  Z68.22         PLAN:    Given 's handouts, including when to have IUD removed, list of danger s/sx, side effects and follow up " recommended. Encouraged condom use for prevention of STD. Back up contraception advised for 7 days if progestin method. Advised to call for any fever, for prolonged or severe pain or bleeding, abnormal vaginal discharge, or unable to palpate strings. She was advised to use pain medications (ibuprofen) as needed for mild to moderate pain. Advised to follow-up in clinic in 4-6 weeks for IUD string check if unable to find strings or as directed by provider.     Gabriela Pereira CNM

## 2022-01-13 RX ADMIN — COPPER 1 EACH: 313.4 INTRAUTERINE DEVICE INTRAUTERINE at 12:37

## 2022-02-07 ENCOUNTER — OFFICE VISIT (OUTPATIENT)
Dept: MIDWIFE SERVICES | Facility: CLINIC | Age: 37
End: 2022-02-07
Payer: COMMERCIAL

## 2022-02-07 VITALS
BODY MASS INDEX: 22.82 KG/M2 | WEIGHT: 124 LBS | DIASTOLIC BLOOD PRESSURE: 64 MMHG | SYSTOLIC BLOOD PRESSURE: 98 MMHG | OXYGEN SATURATION: 98 % | HEART RATE: 92 BPM | HEIGHT: 62 IN

## 2022-02-07 DIAGNOSIS — Z30.431 IUD CHECK UP: Primary | ICD-10-CM

## 2022-02-07 PROCEDURE — 99213 OFFICE O/P EST LOW 20 MIN: CPT | Performed by: MIDWIFE

## 2022-02-07 ASSESSMENT — MIFFLIN-ST. JEOR: SCORE: 1205.71

## 2022-02-07 NOTE — PROGRESS NOTES
Dayana Mazariegos is a 36 year old, routine Paragard IUD check -  1 months post-insertion, correct placement confirmed.        Plan:     1.  Reassurance re: correct placement, normalcy of side effects and that these often lessen with extended use of IUD.   2.  Recommended taking ibuprofen 600 mg [id number] x 5 days to reduce amount and duration of bleeding.    3.  Taught and encouraged to check IUD strings monthly.    4.  Warning signs related to IUD use (PAINS) and when to call CNM reviewed.    Subjective:   Visit interpreted by Danish  on the phone today.  Dayana Mazariegos is a 36 year old female who presents for IUD check. This was inserted on 1/10/22.  The patient has not been sexually active since placement due to bleeding off and on since insertion. She has not  checked her IUD strings. Having menstrual bleeding today, States that she has active bleeding like a period or spotting every three days or so and having white vaginal discharge. Denies odor or itching. Had Nexplanon removed on day of IUD insertion, was not getting her period on the Nexplanon but she wanted it removed because of weight gain. Reviewed use of Ibuprofen for cramping and bleeding to use as needed.      Review of Systems  Pertinent items are noted in HPI.     Objective:     Pelvic:SE only - normal vaginal and mucosal tissue. Menstrual bleeding present and occluding view of cervix removed with several large cotton swabs. No abnormal discharge or odor.  Cervix normal appearance with IUD strings visible x 2 without evidence of IUD itself.  Approximately 2 cm long.

## 2022-06-06 ENCOUNTER — OFFICE VISIT (OUTPATIENT)
Dept: FAMILY MEDICINE | Facility: CLINIC | Age: 37
End: 2022-06-06
Payer: COMMERCIAL

## 2022-06-06 VITALS
HEIGHT: 62 IN | WEIGHT: 122.4 LBS | HEART RATE: 80 BPM | BODY MASS INDEX: 22.52 KG/M2 | SYSTOLIC BLOOD PRESSURE: 88 MMHG | DIASTOLIC BLOOD PRESSURE: 58 MMHG

## 2022-06-06 DIAGNOSIS — M25.521 PAIN IN JOINT INVOLVING UPPER ARM, RIGHT: ICD-10-CM

## 2022-06-06 DIAGNOSIS — M54.2 NECK PAIN: ICD-10-CM

## 2022-06-06 DIAGNOSIS — Z00.00 ROUTINE ADULT HEALTH MAINTENANCE: Primary | ICD-10-CM

## 2022-06-06 DIAGNOSIS — S46.819D STRAIN OF TRAPEZIUS MUSCLE, UNSPECIFIED LATERALITY, SUBSEQUENT ENCOUNTER: ICD-10-CM

## 2022-06-06 DIAGNOSIS — K64.4 EXTERNAL HEMORRHOIDS: ICD-10-CM

## 2022-06-06 PROBLEM — Z30.8 ENCOUNTER FOR OTHER CONTRACEPTIVE MANAGEMENT: Status: RESOLVED | Noted: 2022-01-12 | Resolved: 2022-06-06

## 2022-06-06 LAB
CHOLEST SERPL-MCNC: 139 MG/DL
DEPRECATED CALCIDIOL+CALCIFEROL SERPL-MC: 11 UG/L (ref 20–75)
FASTING STATUS PATIENT QL REPORTED: ABNORMAL
FASTING STATUS PATIENT QL REPORTED: ABNORMAL
GLUCOSE BLD-MCNC: 67 MG/DL (ref 70–125)
HDLC SERPL-MCNC: 43 MG/DL
LDLC SERPL CALC-MCNC: 86 MG/DL
TRIGL SERPL-MCNC: 50 MG/DL
TSH SERPL DL<=0.005 MIU/L-ACNC: 0.95 UIU/ML (ref 0.3–5)

## 2022-06-06 PROCEDURE — 84443 ASSAY THYROID STIM HORMONE: CPT | Performed by: FAMILY MEDICINE

## 2022-06-06 PROCEDURE — 82306 VITAMIN D 25 HYDROXY: CPT | Performed by: FAMILY MEDICINE

## 2022-06-06 PROCEDURE — 80061 LIPID PANEL: CPT | Performed by: FAMILY MEDICINE

## 2022-06-06 PROCEDURE — 99213 OFFICE O/P EST LOW 20 MIN: CPT | Mod: 25 | Performed by: FAMILY MEDICINE

## 2022-06-06 PROCEDURE — 82947 ASSAY GLUCOSE BLOOD QUANT: CPT | Performed by: FAMILY MEDICINE

## 2022-06-06 PROCEDURE — 36415 COLL VENOUS BLD VENIPUNCTURE: CPT | Performed by: FAMILY MEDICINE

## 2022-06-06 PROCEDURE — 99395 PREV VISIT EST AGE 18-39: CPT | Performed by: FAMILY MEDICINE

## 2022-06-06 RX ORDER — CYCLOBENZAPRINE HCL 10 MG
10 TABLET ORAL 3 TIMES DAILY PRN
Qty: 90 TABLET | Refills: 1 | Status: SHIPPED | OUTPATIENT
Start: 2022-06-06

## 2022-06-06 ASSESSMENT — ENCOUNTER SYMPTOMS
SHORTNESS OF BREATH: 0
HEARTBURN: 0
HEMATOCHEZIA: 0
PALPITATIONS: 0
JOINT SWELLING: 0
HEMATURIA: 0
CHILLS: 0
PARESTHESIAS: 0
ABDOMINAL PAIN: 0
CONSTIPATION: 0
HEADACHES: 0
DYSURIA: 0
ARTHRALGIAS: 1
EYE PAIN: 0
MYALGIAS: 1
FREQUENCY: 0
WEAKNESS: 0
NAUSEA: 0
DIARRHEA: 0
COUGH: 0
DIZZINESS: 0
FEVER: 0
BREAST MASS: 0
NERVOUS/ANXIOUS: 0
SORE THROAT: 0

## 2022-06-06 NOTE — LETTER
June 7, 2022      Dayana Mazariegos  [address]  [facility] [place] [address]        Dear ,    We are writing to inform you of your test results.    Resulted Orders   Lipid Profile (Chol, Trig, HDL, LDL calc)   Result Value Ref Range    Cholesterol 139 <=199 mg/dL    Triglycerides 50 <=149 mg/dL    Direct Measure HDL 43 (L) >=50 mg/dL    LDL Cholesterol Calculated 86 <=129 mg/dL    Patient Fasting > 8hrs? Unknown    Glucose   Result Value Ref Range    Glucose 67 (L) 70 - 125 mg/dL    Patient Fasting > 8hrs? Unknown    Vitamin D deficiency screening   Result Value Ref Range    Vitamin D, Total (25-Hydroxy) 11 (L) 20 - 75 ug/L   TSH with free T4 reflex   Result Value Ref Range    TSH 0.95 0.30 - 5.00 uIU/mL     Your vitamin D level is low.  Please take a daily vitamin D supplement (2000iu daily). Feel free to contact the clinic if you have questions.  Have a great week.      Sincerely,      Darya Edwards MD

## 2022-06-06 NOTE — PROGRESS NOTES
SUBJECTIVE:   CC: Dayana Mazariegos is an 36 year old woman who presents for preventive health visit.       Patient has been advised of split billing requirements and indicates understanding: Yes  Healthy Habits:     Getting at least 3 servings of Calcium per day:  Yes    Bi-annual eye exam:  NO    Dental care twice a year:  Yes    Sleep apnea or symptoms of sleep apnea:  None    Diet:  Other    Frequency of exercise:  None    Taking medications regularly:  Yes    Barriers to taking medications:  None    Medication side effects:  None    PHQ-2 Total Score: 0    Additional concerns today:  No      Today's PHQ-2 Score:   PHQ-2 ( 1999 [employer]) 6/6/2022   Q1: Little interest or pleasure in doing things 0   Q2: Feeling down, depressed or hopeless 0   PHQ-2 Score 0   Q1: Little interest or pleasure in doing things Not at all   Q2: Feeling down, depressed or hopeless Not at all   PHQ-2 Score 0       Abuse: Current or Past (Physical, Sexual or Emotional) - No  Do you feel safe in your environment? Yes        Social History     Tobacco Use     Smoking status: Never Smoker     Smokeless tobacco: Never Used   Substance Use Topics     Alcohol use: No     If you drink alcohol do you typically have >3 drinks per day or >7 drinks per week? No    Alcohol Use 6/6/2022   Prescreen: >3 drinks/day or >7 drinks/week? No   Prescreen: >3 drinks/day or >7 drinks/week? -   No flowsheet data found.    Reviewed orders with patient.  Reviewed health maintenance and updated orders accordingly - Yes  Lab work is in process    Breast Cancer Screening:    Breast CA Risk Assessment (FHS-7) 6/6/2022   Do you have a family history of breast, colon, or ovarian cancer? No / Unknown       click delete button to remove this line now  Patient under 40 years of age: Routine Mammogram Screening not recommended.   Pertinent mammograms are reviewed under the imaging tab.    History of abnormal Pap smear: NO - age 30-65 PAP every 5 years with negative HPV  "co-testing recommended  PAP / HPV Latest Ref Rng & Units 11/17/2021 6/8/2016   PAP   Negative for Intraepithelial Lesion or Malignancy (NILM) Negative for squamous intraepithelial lesion or malignancy  Electronically signed by Marivel Santiago CT (ASCP) on 6/14/2016 at 12:15 PM     HPV16 Negative Negative -   HPV18 Negative Negative -   HRHPV Negative Negative -     Reviewed and updated as needed this visit by clinical staff   Tobacco  Allergies  Meds  Problems               Reviewed and updated as needed this visit by Provider     Meds  Problems              Past Medical History:   Diagnosis Date     Gestational diabetes       Past Surgical History:   Procedure Laterality Date     INCISION AND DRAINAGE OF WOUND  10/01/2017    vaginal cyst I&D at [facility]     WISDOM TOOTH EXTRACTION         Review of Systems  CONSTITUTIONAL: NEGATIVE for fever, chills, change in weight  INTEGUMENTARU/SKIN: NEGATIVE for worrisome rashes, moles or lesions  EYES: NEGATIVE for vision changes or irritation  ENT: NEGATIVE for ear, mouth and throat problems  RESP: NEGATIVE for significant cough or SOB  BREAST: NEGATIVE for masses, tenderness or discharge  CV: NEGATIVE for chest pain, palpitations or peripheral edema  GI: NEGATIVE for nausea, abdominal pain, heartburn, or change in bowel habits  : NEGATIVE for unusual urinary or vaginal symptoms. Periods are regular.  MUSCULOSKELETAL: NEGATIVE for significant arthralgias or myalgia  NEURO: NEGATIVE for weakness, dizziness or paresthesias  PSYCHIATRIC: NEGATIVE for changes in mood or affect     OBJECTIVE:   BP (!) 88/58 (BP Location: Left arm, Patient Position: Sitting, Cuff Size: Adult Small)   Pulse 80   Ht 1.575 m (5' 2\")   Wt 55.5 kg (122 lb 6.4 oz)   LMP 05/24/2022 (Exact Date)   Breastfeeding No   BMI 22.39 kg/m    Physical Exam  GENERAL: healthy, alert and no distress  EYES: Eyes grossly normal to inspection, PERRL and conjunctivae and sclerae normal  HENT: ear " canals and TM's normal  NECK: no adenopathy, no asymmetry, masses, or scars and thyroid normal to palpation  RESP: lungs clear to auscultation - no rales, rhonchi or wheezes  BREAST: normal without masses, tenderness or nipple discharge and no palpable axillary masses or adenopathy  CV: regular rate and rhythm, normal S1 S2, no S3 or S4, no murmur, click or rub, no peripheral edema and peripheral pulses strong  ABDOMEN: soft, nontender, no hepatosplenomegaly, no masses and bowel sounds normal  MS: no gross musculoskeletal defects noted, no edema  SKIN: no suspicious lesions or rashes  NEURO: Normal strength and tone, mentation intact and speech normal  PSYCH: mentation appears normal, affect normal/bright    Diagnostic Test Results:  Labs reviewed in Epic    ASSESSMENT/PLAN:   Dayana was seen today for physical.    Diagnoses and all orders for this visit:    Routine adult health maintenance  -     Lipid Profile (Chol, Trig, HDL, LDL calc); Future  -     Glucose; Future  -     Vitamin D deficiency screening; Future  -     TSH with free T4 reflex; Future  We discussed healthy lifestyle, nutrition, cardiovascular risk reduction, self care, safety, sunscreen, and timing of cancer screening.  Health maintenance screening and immunizations reviewed with the patient.  Follow up yearly for the annual physical.    Neck pain, Strain of trapezius muscle, right shoulder pain, numbness of right arm  Works as a  and suspect the repetitive motion while at work may be contributing to the pain  PT & FLEXERIL  Follow-up prn  -     Physical Therapy Referral; Future  -     cyclobenzaprine (FLEXERIL) 10 MG tablet; Take 1 tablet (10 mg) by mouth 3 times daily as needed for muscle spasms    External hemorrhoids  -     Colorectal Surgery Referral; Future        Patient has been advised of split billing requirements and indicates understanding: Yes    COUNSELING:  Reviewed preventive health counseling, as reflected in patient  "instructions    Estimated body mass index is 22.39 kg/m  as calculated from the following:    Height as of this encounter: 1.575 m (5' 2\").    Weight as of this encounter: 55.5 kg (122 lb 6.4 oz).        She reports that she has never smoked. She has never used smokeless tobacco.      Counseling Resources:  ATP IV Guidelines  Pooled Cohorts Equation Calculator  Breast Cancer Risk Calculator  BRCA-Related Cancer Risk Assessment: FHS-7 Tool  FRAX Risk Assessment  ICSI Preventive Guidelines  Dietary Guidelines for Americans, 2010  USDA's MyPlate  ASA Prophylaxis  Lung CA Screening    Darya Edwards MD  [facility]  "

## 2022-07-07 ENCOUNTER — HOSPITAL ENCOUNTER (OUTPATIENT)
Dept: PHYSICAL THERAPY | Facility: REHABILITATION | Age: 37
Discharge: HOME | End: 2022-07-07
Attending: FAMILY MEDICINE
Payer: COMMERCIAL

## 2022-07-07 DIAGNOSIS — R29.898 DECREASED RANGE OF MOTION OF NECK: Primary | ICD-10-CM

## 2022-07-07 DIAGNOSIS — M54.2 NECK PAIN: ICD-10-CM

## 2022-07-07 DIAGNOSIS — M25.521 PAIN IN JOINT INVOLVING UPPER ARM, RIGHT: ICD-10-CM

## 2022-07-07 DIAGNOSIS — S46.819D STRAIN OF TRAPEZIUS MUSCLE, UNSPECIFIED LATERALITY, SUBSEQUENT ENCOUNTER: ICD-10-CM

## 2022-07-07 DIAGNOSIS — M62.81 MUSCLE WEAKNESS (GENERALIZED): ICD-10-CM

## 2022-07-07 PROCEDURE — 97161 PT EVAL LOW COMPLEX 20 MIN: CPT | Mod: GP | Performed by: PHYSICAL THERAPIST

## 2022-07-07 PROCEDURE — 97112 NEUROMUSCULAR REEDUCATION: CPT | Mod: GP | Performed by: PHYSICAL THERAPIST

## 2022-07-07 PROCEDURE — 97110 THERAPEUTIC EXERCISES: CPT | Mod: GP | Performed by: PHYSICAL THERAPIST

## 2022-07-07 NOTE — DISCHARGE INSTRUCTIONS
Nerve glide    Look to or away from the hand for good stretch x10-20 reps 2-3x/day      Stretch back in between every client for 5-10 seconds x2-3 reps       Chin tuck - pull head backwards and hold x1-2 seconds x5-10 reps 2-3x/day
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
## 2022-07-21 ENCOUNTER — HOSPITAL ENCOUNTER (OUTPATIENT)
Dept: PHYSICAL THERAPY | Facility: REHABILITATION | Age: 37
Discharge: HOME | End: 2022-07-21
Payer: COMMERCIAL

## 2022-07-21 DIAGNOSIS — M25.521 PAIN IN JOINT INVOLVING UPPER ARM, RIGHT: ICD-10-CM

## 2022-07-21 DIAGNOSIS — R29.898 DECREASED RANGE OF MOTION OF NECK: ICD-10-CM

## 2022-07-21 DIAGNOSIS — M54.2 NECK PAIN: Primary | ICD-10-CM

## 2022-07-21 DIAGNOSIS — M62.81 MUSCLE WEAKNESS (GENERALIZED): ICD-10-CM

## 2022-07-21 DIAGNOSIS — S46.819D STRAIN OF TRAPEZIUS MUSCLE, UNSPECIFIED LATERALITY, SUBSEQUENT ENCOUNTER: ICD-10-CM

## 2022-07-21 PROCEDURE — 97140 MANUAL THERAPY 1/> REGIONS: CPT | Mod: GP | Performed by: PHYSICAL THERAPIST

## 2022-07-21 PROCEDURE — 97112 NEUROMUSCULAR REEDUCATION: CPT | Mod: GP | Performed by: PHYSICAL THERAPIST

## 2022-07-21 PROCEDURE — 97110 THERAPEUTIC EXERCISES: CPT | Mod: GP | Performed by: PHYSICAL THERAPIST

## 2022-08-09 ENCOUNTER — HOSPITAL ENCOUNTER (OUTPATIENT)
Dept: PHYSICAL THERAPY | Facility: REHABILITATION | Age: 37
Discharge: HOME | End: 2022-08-09
Payer: COMMERCIAL

## 2022-08-09 DIAGNOSIS — M62.81 MUSCLE WEAKNESS (GENERALIZED): ICD-10-CM

## 2022-08-09 DIAGNOSIS — S46.819D STRAIN OF TRAPEZIUS MUSCLE, UNSPECIFIED LATERALITY, SUBSEQUENT ENCOUNTER: ICD-10-CM

## 2022-08-09 DIAGNOSIS — M25.521 PAIN IN JOINT INVOLVING UPPER ARM, RIGHT: ICD-10-CM

## 2022-08-09 DIAGNOSIS — R29.898 DECREASED RANGE OF MOTION OF NECK: ICD-10-CM

## 2022-08-09 DIAGNOSIS — M54.2 NECK PAIN: Primary | ICD-10-CM

## 2022-08-09 PROCEDURE — 97140 MANUAL THERAPY 1/> REGIONS: CPT | Mod: GP | Performed by: PHYSICAL THERAPIST

## 2022-08-09 PROCEDURE — 97110 THERAPEUTIC EXERCISES: CPT | Mod: GP | Performed by: PHYSICAL THERAPIST

## 2022-08-09 PROCEDURE — 97535 SELF CARE MNGMENT TRAINING: CPT | Mod: GP | Performed by: PHYSICAL THERAPIST

## 2022-08-09 NOTE — DISCHARGE INSTRUCTIONS
Try to do tennis balls in sock and lay on to help neck tightness      Try to nod small x20-30 reps to help with neck tightness  1-2x/day    Try to put pillow into curve of neck while sleeping and let shoulders stretch down to the bed

## 2022-08-18 DIAGNOSIS — M54.2 NECK PAIN: Primary | ICD-10-CM

## 2022-08-18 DIAGNOSIS — E55.9 VITAMIN D INSUFFICIENCY: ICD-10-CM

## 2022-08-18 RX ORDER — TIZANIDINE 2 MG/1
2 TABLET ORAL 3 TIMES DAILY
Qty: 30 TABLET | Refills: 1 | Status: SHIPPED | OUTPATIENT
Start: 2022-08-18

## 2022-08-30 ENCOUNTER — HOSPITAL ENCOUNTER (OUTPATIENT)
Dept: PHYSICAL THERAPY | Facility: REHABILITATION | Age: 37
Discharge: HOME | End: 2022-08-30
Payer: COMMERCIAL

## 2022-08-30 ENCOUNTER — NURSE TRIAGE (OUTPATIENT)
Dept: NURSING | Facility: CLINIC | Age: 37
End: 2022-08-30

## 2022-08-30 DIAGNOSIS — M54.2 NECK PAIN: Primary | ICD-10-CM

## 2022-08-30 DIAGNOSIS — S46.819D STRAIN OF TRAPEZIUS MUSCLE, UNSPECIFIED LATERALITY, SUBSEQUENT ENCOUNTER: ICD-10-CM

## 2022-08-30 DIAGNOSIS — M25.521 PAIN IN JOINT INVOLVING UPPER ARM, RIGHT: ICD-10-CM

## 2022-08-30 DIAGNOSIS — M62.81 MUSCLE WEAKNESS (GENERALIZED): ICD-10-CM

## 2022-08-30 DIAGNOSIS — R29.898 DECREASED RANGE OF MOTION OF NECK: ICD-10-CM

## 2022-08-30 PROCEDURE — 97110 THERAPEUTIC EXERCISES: CPT | Mod: GP | Performed by: PHYSICAL THERAPIST

## 2022-08-30 PROCEDURE — 97012 MECHANICAL TRACTION THERAPY: CPT | Mod: GP | Performed by: PHYSICAL THERAPIST

## 2022-08-30 NOTE — TELEPHONE ENCOUNTER
Writer double booked pt for OV on 08/31/2022 at 1400 arrival time. PCP approved double book.    Carmencita Hayes RN

## 2022-08-30 NOTE — TELEPHONE ENCOUNTER
"Nurse Triage SBAR    Is this a 2nd Level Triage? YES, LICENSED PRACTITIONER REVIEW IS REQUIRED    Situation: , [facility], calling about patient having dizziness and low blood pressure in the evenings for the last week.  Consent: on file in chart    Background: For the last week patient has felt dizzy in the evenings after work and her blood pressure has been lower. Last night it was 92/56.     2 days ago in the AM blood pressure was 107/65    Concerned because mother  of blood cancer    Assessment:   Blood pressure was 92/56 last night, Has not checked it today because they are at work and do not have a blood pressure monitor.     No dizziness currently- states \"she feels fine right now\"  Wakes up a lot at night  \"Feels very cold in her back bones every day, even when it is hot out\"  No fever  Staying hydrated       Protocol Recommended Disposition:   See provider within 3 days - no clinic appointments available and patient does not want to go to urgent care. Would like to see if Dr. Connor could fit her into their schedule.    Recommendation: Advised patient to Go to urgent care, but patient declined. Would like to speak to PCP.     Routed to provider to advise.    Does the patient meet one of the following criteria for ADS visit consideration? 16+ years old, with an [facility] PCP     TIP  Providers, please consider if this condition is appropriate for management at one of our Acute and Diagnostic Services sites.     If patient is a good candidate, please use dotphrase <dot>triageresponse and select Refer to ADS to document.     Cori Chavez RN [facility] 2022 10:29 AM    Reason for Disposition    MILD dizziness (e.g., walking normally) and has NOT been evaluated by physician for this (Exception: dizziness caused by heat exposure, sudden standing, or poor fluid intake)    Additional Information    Negative: Systolic BP < 90 and feeling dizzy, lightheaded, or weak    Negative: Started " "suddenly after an allergic medicine, an allergic food, or bee sting    Negative: Shock suspected (e.g., cold/pale/clammy skin, too weak to stand, low BP, rapid pulse)    Negative: Difficult to awaken or acting confused  (e.g., disoriented, slurred speech)    Negative: Fainted    Negative: Chest pain    Negative: Bleeding (e.g., vomiting blood, rectal bleeding or tarry stools, severe vaginal bleeding)    Negative: Extra heart beats or heart is beating fast  (i.e., \"palpitations\")    Negative: Sounds like a life-threatening emergency to the triager    Negative: Systolic BP < 80 and NOT dizzy, lightheaded or weak (feels normal)    Negative: Abdominal pain    Negative: Major surgery in the past month    Negative: Fever > 100.4 F (38.0 C)    Negative: Drinking very little and has signs of dehydration (e.g., no urine > 12 hours, very dry mouth, very lightheaded)    Negative: Fall in systolic BP > 20 mm Hg from normal and feeling dizzy, lightheaded, or weak    Negative: Patient sounds very sick or weak to the triager    Negative: Systolic BP < 90 and NOT dizzy, lightheaded or weak    Negative: SEVERE difficulty breathing (e.g., struggling for each breath, speaks in single words)    Negative: Shock suspected (e.g., cold/pale/clammy skin, too weak to stand, low BP, rapid pulse)    Negative: Difficult to awaken or acting confused (e.g., disoriented, slurred speech)    Negative: Fainted, and still feels dizzy afterwards    Negative: Overdose (accidental or intentional) of medications    Negative: New neurologic deficit that is present now: * Weakness of the face, arm, or leg on one side of the body * Numbness of the face, arm, or leg on one side of the body * Loss of speech or garbled speech    Negative: Heart beating < 50 beats per minute OR > 140 beats per minute    Negative: Sounds like a life-threatening emergency to the triager    Negative: Chest pain    Negative: Rectal bleeding, bloody stool, or tarry-black stool    " Negative: Vomiting is main symptom    Negative: Diarrhea is main symptom    Negative: Headache is main symptom    Negative: Heat exhaustion suspected (i.e., dehydration from heat exposure)    Negative: Patient states that they are having an anxiety or panic attack    Negative: Dizziness from low blood sugar (i.e., < 60 mg/dl or 3.5 mmol/l)    Negative: SEVERE dizziness (e.g., unable to stand, requires support to walk, feels like passing out now)    Negative: SEVERE headache or neck pain    Negative: Spinning or tilting sensation (vertigo) present now and one or more stroke risk factors (i.e., hypertension, diabetes mellitus, prior stroke/TIA, heart attack, age over 60) (Exception: prior physician evaluation for this AND no different/worse than usual)    Negative: Neurologic deficit that was brief (now gone), ANY of the following:* Weakness of the face, arm, or leg on one side of the body* Numbness of the face, arm, or leg on one side of the body* Loss of speech or garbled speech    Negative: Loss of vision or double vision  (Exception: Similar to previous migraines.)    Negative: Extra heart beats OR irregular heart beating (i.e., 'palpitations')    Negative: Difficulty breathing    Negative: Drinking very little and has signs of dehydration (e.g., no urine > 12 hours, very dry mouth, very lightheaded)    Negative: Follows bleeding (e.g., stomach, rectum, vagina)  (Exception: Became dizzy from sight of small amount blood.)    Negative: Patient sounds very sick or weak to the triager    Negative: Lightheadedness (dizziness) present now, after 2 hours of rest and fluids    Negative: Spinning or tilting sensation (vertigo) present now    Negative: Fever > 103 F (39.4 C)    Negative: Fever > 100.0 F (37.8 C) and has diabetes mellitus or a weak immune system (e.g., HIV positive, cancer chemotherapy, organ transplant, splenectomy, chronic steroids)    Negative: MODERATE dizziness (e.g., interferes with normal activities)  (Exception: dizziness caused by heat exposure, sudden standing, or poor fluid intake)    Negative: Vomiting occurs with dizziness    Negative: Patient wants to be seen    Negative: Taking a medicine that could cause dizziness (e.g., blood pressure medications, diuretics)    Protocols used: DIZZINESS-A-OH, LOW BLOOD PRESSURE-A-OH

## 2022-08-31 ENCOUNTER — OFFICE VISIT (OUTPATIENT)
Dept: FAMILY MEDICINE | Facility: CLINIC | Age: 37
End: 2022-08-31
Payer: COMMERCIAL

## 2022-08-31 VITALS
BODY MASS INDEX: 21.95 KG/M2 | SYSTOLIC BLOOD PRESSURE: 98 MMHG | WEIGHT: 120 LBS | DIASTOLIC BLOOD PRESSURE: 70 MMHG | HEART RATE: 68 BPM

## 2022-08-31 DIAGNOSIS — R42 DIZZINESS: Primary | ICD-10-CM

## 2022-08-31 LAB
ABO/RH(D): NORMAL
ERYTHROCYTE [DISTWIDTH] IN BLOOD BY AUTOMATED COUNT: 12.5 % (ref 10–15)
HBA1C MFR BLD: 5.5 % (ref 0–5.6)
HCT VFR BLD AUTO: 43 % (ref 35–47)
HGB BLD-MCNC: 15 G/DL (ref 11.7–15.7)
MCH RBC QN AUTO: 30 PG (ref 26.5–33)
MCHC RBC AUTO-ENTMCNC: 34.9 G/DL (ref 31.5–36.5)
MCV RBC AUTO: 86 FL (ref 78–100)
PLATELET # BLD AUTO: 203 10E3/UL (ref 150–450)
RBC # BLD AUTO: 5 10E6/UL (ref 3.8–5.2)
SPECIMEN EXPIRATION DATE: NORMAL
T3FREE SERPL-MCNC: 2.8 PG/ML (ref 2–4.4)
T4 FREE SERPL-MCNC: 1.43 NG/DL (ref 0.9–1.7)
TSH SERPL DL<=0.005 MIU/L-ACNC: 1.6 UIU/ML (ref 0.3–4.2)
WBC # BLD AUTO: 7.2 10E3/UL (ref 4–11)

## 2022-08-31 PROCEDURE — 99214 OFFICE O/P EST MOD 30 MIN: CPT | Performed by: FAMILY MEDICINE

## 2022-08-31 PROCEDURE — 83036 HEMOGLOBIN GLYCOSYLATED A1C: CPT | Performed by: FAMILY MEDICINE

## 2022-08-31 PROCEDURE — 85027 COMPLETE CBC AUTOMATED: CPT | Performed by: FAMILY MEDICINE

## 2022-08-31 PROCEDURE — 36415 COLL VENOUS BLD VENIPUNCTURE: CPT | Performed by: FAMILY MEDICINE

## 2022-08-31 PROCEDURE — 84443 ASSAY THYROID STIM HORMONE: CPT | Performed by: FAMILY MEDICINE

## 2022-08-31 PROCEDURE — 86901 BLOOD TYPING SEROLOGIC RH(D): CPT | Performed by: FAMILY MEDICINE

## 2022-08-31 PROCEDURE — 84439 ASSAY OF FREE THYROXINE: CPT | Performed by: FAMILY MEDICINE

## 2022-08-31 PROCEDURE — 87086 URINE CULTURE/COLONY COUNT: CPT | Performed by: FAMILY MEDICINE

## 2022-08-31 PROCEDURE — 86900 BLOOD TYPING SEROLOGIC ABO: CPT | Performed by: FAMILY MEDICINE

## 2022-08-31 PROCEDURE — 80053 COMPREHEN METABOLIC PANEL: CPT | Performed by: FAMILY MEDICINE

## 2022-08-31 PROCEDURE — 84481 FREE ASSAY (FT-3): CPT | Performed by: FAMILY MEDICINE

## 2022-08-31 NOTE — LETTER
September 1, 2022      Dayana Mazariegos  [address] [facility] [address]        Dear ,    We are writing to inform you of your test results.    Resulted Orders   CBC with platelets   Result Value Ref Range    WBC Count 7.2 4.0 - 11.0 10e3/uL    RBC Count 5.00 3.80 - 5.20 10e6/uL    Hemoglobin 15.0 11.7 - 15.7 g/dL    Hematocrit 43.0 35.0 - 47.0 %    MCV 86 78 - 100 fL    MCH 30.0 26.5 - 33.0 pg    MCHC 34.9 31.5 - 36.5 g/dL    RDW 12.5 10.0 - 15.0 %    Platelet Count 203 150 - 450 10e3/uL   Comprehensive metabolic panel (BMP + Alb, Alk Phos, ALT, AST, Total. Bili, TP)   Result Value Ref Range    Sodium 136 136 - 145 mmol/L    Potassium 3.8 3.4 - 5.3 mmol/L    Creatinine 0.59 0.51 - 0.95 mg/dL    Urea Nitrogen 12.1 6.0 - 20.0 mg/dL    Chloride 102 98 - 107 mmol/L    Carbon Dioxide (CO2) 24 22 - 29 mmol/L    Anion Gap 10 7 - 15 mmol/L    Glucose 80 70 - 99 mg/dL    Calcium 9.3 8.6 - 10.0 mg/dL    Protein Total 7.9 6.4 - 8.3 g/dL    Albumin 4.8 3.5 - 5.2 g/dL    Bilirubin Total 0.5 <=1.2 mg/dL    Alkaline Phosphatase 83 35 - 104 U/L    AST 26 10 - 35 U/L    ALT 16 10 - 35 U/L    GFR Estimate >90 >60 mL/min/1.73m2   Hemoglobin A1c   Result Value Ref Range    Hemoglobin A1C 5.5 0.0 - 5.6 %   ABO and Rh   Result Value Ref Range    ABO/RH(D) B POS     SPECIMEN EXPIRATION DATE [id number]    TSH   Result Value Ref Range    TSH 1.60 0.30 - 4.20 uIU/mL   T4 free   Result Value Ref Range    Free T4 1.43 0.90 - 1.70 ng/dL   T3 Free   Result Value Ref Range    T3 Free 2.8 2.0 - 4.4 pg/mL   Urine Culture Aerobic Bacterial - lab collect   Result Value Ref Range    Culture No growth, less than 1 day      I spoke to your  on the phone. Your blood type is B positive. You don't have diabetes, thyroid disorder, anemia, kidney disease, or liver disease based on your lab results.     If you have any questions or concerns, please call the clinic at the number listed above.       Sincerely,      Darya Connor  MD [facility]

## 2022-08-31 NOTE — PROGRESS NOTES
Assessment & Plan     Dizziness, cold sensation   Unclear etiology.  Monitor symptoms for now.  Encourage hydration and healthy nutrition.  Await lab results  - CBC with platelets  - Comprehensive metabolic panel (BMP + Alb, Alk Phos, ALT, AST, Total. Bili, TP)  - Hemoglobin A1c  - ABO and Rh  - TSH  - T4 free  - T3 Free  - Urine Culture Aerobic Bacterial - lab collect    Musculoskeletal pain  Neck pain, arm pain, shoulder pain  Continue with physical therapy  Continue with as needed muscle relaxant    Darya Edwards MD  [facility]    Delia Anne is a 36 year old presenting for the following health issues:  Dizziness      HPI     Over the last week she has been noticing dizziness and cold sensation on her back.  The cold sensation on her back has been affecting her sleep.  She attributes her symptoms to that of low blood pressure.  Her blood pressure at home has been 93/58 with a pulse rate of 74, 92/59 pulse rate of 73, 94/62 pulse rate of 73.  These blood pressure and heart rate numbers seem to be consistent with her previous numbers.  She has mild intermittent headaches.  Denies nausea, abdominal pain, dysuria, fever, vomiting, diarrhea.  Due to her mom's diagnosis of leukemia in her mid 30s, the patient is worried about her own blood levels and would like to get blood drawn today.  She has a family history of thyroid dysfunction in her father.    She does not take any medications besides that of vitamin D 2000 IU for which she has just started taking for about a week now.  She takes muscle relaxant for her neck pain, arm pain, shoulder pain.  She has been seeing physical therapy for the symptoms.      Objective    BP 98/70   Pulse 68   Wt 54.4 kg (120 lb)   BMI 21.95 kg/m    Body mass index is 21.95 kg/m .  Physical Exam     Constitutional: Patient is oriented to person, place, and time. Patient appears well-developed and well-nourished. No distress.    Head: Normocephalic and atraumatic.   Right Ear: External ear normal.   Left Ear: External ear normal.   Eyes: Conjunctivae and EOM are normal. Right eye exhibits no discharge. Left eye exhibits no discharge. No scleral icterus.   Neurological: Patient is alert and oriented to person, place, and time.  Skin: No rash noted. Patient is not diaphoretic. No erythema. No pallor.

## 2022-09-01 LAB
ALBUMIN SERPL BCG-MCNC: 4.8 G/DL (ref 3.5–5.2)
ALP SERPL-CCNC: 83 U/L (ref 35–104)
ALT SERPL W P-5'-P-CCNC: 16 U/L (ref 10–35)
ANION GAP SERPL CALCULATED.3IONS-SCNC: 10 MMOL/L (ref 7–15)
AST SERPL W P-5'-P-CCNC: 26 U/L (ref 10–35)
BILIRUB SERPL-MCNC: 0.5 MG/DL
BUN SERPL-MCNC: 12.1 MG/DL (ref 6–20)
CALCIUM SERPL-MCNC: 9.3 MG/DL (ref 8.6–10)
CHLORIDE SERPL-SCNC: 102 MMOL/L (ref 98–107)
CREAT SERPL-MCNC: 0.59 MG/DL (ref 0.51–0.95)
DEPRECATED HCO3 PLAS-SCNC: 24 MMOL/L (ref 22–29)
GFR SERPL CREATININE-BSD FRML MDRD: >90 ML/MIN/1.73M2
GLUCOSE SERPL-MCNC: 80 MG/DL (ref 70–99)
POTASSIUM SERPL-SCNC: 3.8 MMOL/L (ref 3.4–5.3)
PROT SERPL-MCNC: 7.9 G/DL (ref 6.4–8.3)
SODIUM SERPL-SCNC: 136 MMOL/L (ref 136–145)

## 2022-09-02 LAB — BACTERIA UR CULT: NO GROWTH

## 2022-09-15 ENCOUNTER — HOSPITAL ENCOUNTER (OUTPATIENT)
Dept: PHYSICAL THERAPY | Facility: REHABILITATION | Age: 37
Discharge: HOME | End: 2022-09-15
Payer: COMMERCIAL

## 2022-09-15 DIAGNOSIS — M62.81 MUSCLE WEAKNESS (GENERALIZED): ICD-10-CM

## 2022-09-15 DIAGNOSIS — M54.2 NECK PAIN: Primary | ICD-10-CM

## 2022-09-15 DIAGNOSIS — M25.521 PAIN IN JOINT INVOLVING UPPER ARM, RIGHT: ICD-10-CM

## 2022-09-15 DIAGNOSIS — R29.898 DECREASED RANGE OF MOTION OF NECK: ICD-10-CM

## 2022-09-15 DIAGNOSIS — S46.819D STRAIN OF TRAPEZIUS MUSCLE, UNSPECIFIED LATERALITY, SUBSEQUENT ENCOUNTER: ICD-10-CM

## 2022-09-15 PROCEDURE — 97012 MECHANICAL TRACTION THERAPY: CPT | Mod: GP | Performed by: PHYSICAL THERAPIST

## 2022-09-15 PROCEDURE — 97110 THERAPEUTIC EXERCISES: CPT | Mod: GP | Performed by: PHYSICAL THERAPIST

## 2022-09-15 PROCEDURE — 97535 SELF CARE MNGMENT TRAINING: CPT | Mod: GP | Performed by: PHYSICAL THERAPIST

## 2022-09-29 ENCOUNTER — HOSPITAL ENCOUNTER (OUTPATIENT)
Dept: PHYSICAL THERAPY | Facility: REHABILITATION | Age: 37
Discharge: HOME | End: 2022-09-29
Payer: COMMERCIAL

## 2022-09-29 DIAGNOSIS — M54.2 NECK PAIN: Primary | ICD-10-CM

## 2022-09-29 DIAGNOSIS — S46.819D STRAIN OF TRAPEZIUS MUSCLE, UNSPECIFIED LATERALITY, SUBSEQUENT ENCOUNTER: ICD-10-CM

## 2022-09-29 DIAGNOSIS — M25.521 PAIN IN JOINT INVOLVING UPPER ARM, RIGHT: ICD-10-CM

## 2022-09-29 DIAGNOSIS — M62.81 MUSCLE WEAKNESS (GENERALIZED): ICD-10-CM

## 2022-09-29 DIAGNOSIS — R29.898 DECREASED RANGE OF MOTION OF NECK: ICD-10-CM

## 2022-09-29 PROCEDURE — 97110 THERAPEUTIC EXERCISES: CPT | Mod: GP | Performed by: PHYSICAL THERAPIST

## 2022-09-29 PROCEDURE — 97140 MANUAL THERAPY 1/> REGIONS: CPT | Mod: GP | Performed by: PHYSICAL THERAPIST

## 2022-09-29 NOTE — DISCHARGE INSTRUCTIONS
Thumb opposition    Back and forth to all fingertips x10-20 reps      With rubber binder x10-20 reps      Thumb goes away from your palm x10-20 reps - can do this with rubber binder as well      X10-20 reps     1-2x/day

## 2022-10-04 PROBLEM — O35.8XX0 MATERNAL CARE FOR OTHER (SUSPECTED) FETAL ABNORMALITY AND DAMAGE, NOT APPLICABLE OR UNSPECIFIED: Status: RESOLVED | Noted: 2022-01-04 | Resolved: 2022-01-12

## 2022-10-27 ENCOUNTER — HOSPITAL ENCOUNTER (OUTPATIENT)
Dept: PHYSICAL THERAPY | Facility: REHABILITATION | Age: 37
Discharge: HOME | End: 2022-10-27
Payer: COMMERCIAL

## 2022-10-27 DIAGNOSIS — M54.2 NECK PAIN: Primary | ICD-10-CM

## 2022-10-27 DIAGNOSIS — M25.521 PAIN IN JOINT INVOLVING UPPER ARM, RIGHT: ICD-10-CM

## 2022-10-27 DIAGNOSIS — M62.81 MUSCLE WEAKNESS (GENERALIZED): ICD-10-CM

## 2022-10-27 DIAGNOSIS — R29.898 DECREASED RANGE OF MOTION OF NECK: ICD-10-CM

## 2022-10-27 DIAGNOSIS — S46.819D STRAIN OF TRAPEZIUS MUSCLE, UNSPECIFIED LATERALITY, SUBSEQUENT ENCOUNTER: ICD-10-CM

## 2022-10-27 PROCEDURE — 97012 MECHANICAL TRACTION THERAPY: CPT | Mod: GP | Performed by: PHYSICAL THERAPIST

## 2022-10-27 PROCEDURE — 97110 THERAPEUTIC EXERCISES: CPT | Mod: GP | Performed by: PHYSICAL THERAPIST

## 2022-10-27 NOTE — DISCHARGE INSTRUCTIONS
If looking for new medical provider can try [facility] or [facility]  At [place] Dr. Aleyda Baird is a female who does Family Medicine and there are a couple of nurse practitioners available for new patients as well (address is [facility], [address], [place], [place], [address])

## 2022-10-31 NOTE — PROGRESS NOTES
[facility]    OUTPATIENT PHYSICAL THERAPY  PLAN OF TREATMENT FOR OUTPATIENT REHABILITATION AND PROGRESS NOTE           Patient's Last Name, First Name, Dayana Andrade Date of Birth  1985   Provider's Name  [facility] Medical Record No.  [id number]        Start of Care Date:  07/07/22    Onset Date:  01/07/22   Type:     _X__PT   ___OT   ___SLP Medical Diagnosis:  Neck pain (M54.2)     Strain of trapezius muscle, unspecified laterality, subsequent encounter (S46.819D)     Pain in joint involving upper arm, right (M25.521)      PT Diagnosis:  Neck and B UE pain with numbness and weakness    Plan of Treatment  Frequency/Duration: 1x/every 2-4 weeks  Certification date from 10/4/22 to 1/1/23                    _________________________________________________________________________________  Plan of Treatment/Functional Goals:  ADL retraining, joint mobilization, manual therapy, neuromuscular re-education, ROM, strengthening, stretching     Goals:  See below       I CERTIFY THE NEED FOR THESE SERVICES FURNISHED UNDER        THIS PLAN OF TREATMENT AND WHILE UNDER MY CARE     (Physician co-signature of this document indicates review and certification of the therapy plan).              Referring Provider: Dr. Darya Ruggiero, PT, DPT, CLT       10/27/22 1300   Signing Clinician's Name / Credentials   Signing clinician's name / credentials Gissell Ruggiero PT, DPT, OCS, CLT   Session Number   Session Number 7/12   Progress Note/Recertification   Recertification Due Date 10/04/22   Recertification Completed Date  10/27/22   Adult Goals   PT Ortho Eval Goals 1;2;3   Ortho Goal 1   Goal Description Pt will be able to perform full shift for work without increase in pain in neck or numbness in arms for improved quality of work in 90 days.    Goal Progress IMPROVING - doing better with exercises and stretches during work day but some flare up of R anterior shoulder pain over past week - not sure why   Target Date 01/01/23   Ortho Goal 2   Goal Description Pt will be able to perform ADLs including washing her hair and lifting groceries without increase neck or arm sx for improved QOL in 90 days.   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   Goal Progress IMPROVING - pt has been able to do this but can have some pain on the front of her R shoulder   Target Date 01/01/23       Comments No  present and pt declined using an  today - pt demo'ed understanding of all that was communicated   Subjective Report   Subjective Report Pt reports pain rating 7-8/10 for R anterior shoulder - seems like it was better but over the past week her front R shoulder pain feels like it has flared up again - but R thumb pain is mostly resolved and neck pain and R hand numbness is mostly resolved as well.   Objective Measures   Objective Measures Objective Measure 1;Objective Measure 2;Objective Measure 3;Objective Measure 4   Objective Measure 1   Objective Measure ROM   Details R thumb WNL AROM but painful end range extension, abd and flexion at 1st MPT jt. R shoulder ROM WNL without pain. Neck flex WNL without pain (was limited and painful), ext WNL with good stretch (was 33 degrees with pain neck and shoulder blade), B rotation WNL without pain (R was limited versus L last visit), B SB WNL without pain  (was more stiff R versus L),   Objective Measure 2   Objective Measure Strength - from last visit   Details Pt melba's weakness with R thumb  opposition 4/5, thumb ext and abd 4/5 with pain at 1st MTP jt   Objective Measure 3   Objective Measure Flexibility   Details Tightness R pec major and minor without increase in R arm or thumb pain with stretching - good stretch   Objective Measure 4   Objective Measure Observation   Details While pt is in mechanical traction her R anterior shoulder pain - positive distraction test for R shoulder sx   Modalities   Modalities Mechanical Traction   Mechanical Traction   Traction, Mechanical (36356) 15   Skilled Intervention Performed supine mechanical traction with home Garibay unit at 20 lbs pressure x12' static with set up and cool down.   Patient Response Pt has relief of anterior R shoulder pain with static traction   Treatment Interventions   Interventions Therapeutic Procedure/Exercise;Neuromuscular Re-education;Manual Therapy;Self Care/Home Management   Therapeutic Procedure/exercise   Therapeutic Procedures: strength, endurance, ROM, flexibillity minutes (08590) 24   Skilled Intervention Reassessed response to HEP and activity level since last visit.  Reassessed cervical and R shoulder ROM and attempted various upper quarter stretches to assess for remaining R anterior shoulder sensitivity. Reviewed prior upper quarter stretches for HEP and advised pt to continue. Discussed positions for pt to trial on bathtub edge, with tennis balls in sock or edge of couch to facilitate cervical distraction at home.   Education   Learner Patient   Readiness Eager   Method Booklet/handout   Response Verbalizes Understanding   Plan   Home program HEP - cervical ROM, resistance band rows, goal post scap retraction stretch, chin tuck, median nerve glide, pec minor stretch in doorway   Plan for next session Assess response to mechanical traction if continued neural sx down into B UE. Reassess cervical ROM and progress strengthening as able.   Comments   Comments Pt demo's continued decreased sensitivity of neck tension and R  hand numbness and R thumb pain but has had some increase in deep R anterior shoulder pain that is relieve with cervical distraction. Pt continues to be good candidate for skilled PT services to decrease remaining impairments and reach goals.   Total Session Time   Timed Code Treatment Minutes 39   Total Treatment Time (sum of timed and untimed services) 39   AMBULATORY CLINICS ONLY-MEDICAL AND TREATMENT DIAGNOSIS   Medical Diagnosis Neck pain (M54.2)     Strain of trapezius muscle, unspecified laterality, subsequent encounter (S46.819D)     Pain in joint involving upper arm, right (M25.521)   PT Diagnosis Neck and B UE pain with numbness and weakness

## 2022-11-08 ENCOUNTER — HOSPITAL ENCOUNTER (OUTPATIENT)
Dept: PHYSICAL THERAPY | Facility: REHABILITATION | Age: 37
Discharge: HOME | End: 2022-11-08
Payer: COMMERCIAL

## 2022-11-08 DIAGNOSIS — M25.521 PAIN IN JOINT INVOLVING UPPER ARM, RIGHT: ICD-10-CM

## 2022-11-08 DIAGNOSIS — M54.2 NECK PAIN: Primary | ICD-10-CM

## 2022-11-08 DIAGNOSIS — R29.898 DECREASED RANGE OF MOTION OF NECK: ICD-10-CM

## 2022-11-08 DIAGNOSIS — S46.819D STRAIN OF TRAPEZIUS MUSCLE, UNSPECIFIED LATERALITY, SUBSEQUENT ENCOUNTER: ICD-10-CM

## 2022-11-08 DIAGNOSIS — M62.81 MUSCLE WEAKNESS (GENERALIZED): ICD-10-CM

## 2022-11-08 PROCEDURE — 97012 MECHANICAL TRACTION THERAPY: CPT | Mod: GP | Performed by: PHYSICAL THERAPIST

## 2022-11-08 PROCEDURE — 97110 THERAPEUTIC EXERCISES: CPT | Mod: GP | Performed by: PHYSICAL THERAPIST

## 2022-11-08 NOTE — DISCHARGE INSTRUCTIONS
Thumb strengthening exercises      Rubber binder around end of thumb x10 reps x2 sets 5x/week  Try to make whole thumb straight      Pull thumb down into hand with rubber binder x10 reps x2 sets 5x/week

## 2022-11-22 ENCOUNTER — HOSPITAL ENCOUNTER (OUTPATIENT)
Dept: PHYSICAL THERAPY | Facility: REHABILITATION | Age: 37
Discharge: HOME | End: 2022-11-22
Payer: COMMERCIAL

## 2022-11-22 DIAGNOSIS — S46.819D STRAIN OF TRAPEZIUS MUSCLE, UNSPECIFIED LATERALITY, SUBSEQUENT ENCOUNTER: ICD-10-CM

## 2022-11-22 DIAGNOSIS — M54.2 NECK PAIN: Primary | ICD-10-CM

## 2022-11-22 DIAGNOSIS — R29.898 DECREASED RANGE OF MOTION OF NECK: ICD-10-CM

## 2022-11-22 DIAGNOSIS — M25.521 PAIN IN JOINT INVOLVING UPPER ARM, RIGHT: ICD-10-CM

## 2022-11-22 DIAGNOSIS — M62.81 MUSCLE WEAKNESS (GENERALIZED): ICD-10-CM

## 2022-11-22 PROCEDURE — 97110 THERAPEUTIC EXERCISES: CPT | Mod: GP | Performed by: PHYSICAL THERAPIST

## 2022-11-22 PROCEDURE — 97012 MECHANICAL TRACTION THERAPY: CPT | Mod: GP | Performed by: PHYSICAL THERAPIST

## 2022-12-06 ENCOUNTER — HOSPITAL ENCOUNTER (OUTPATIENT)
Dept: PHYSICAL THERAPY | Facility: REHABILITATION | Age: 37
Discharge: HOME | End: 2022-12-06
Payer: COMMERCIAL

## 2022-12-06 DIAGNOSIS — M62.81 MUSCLE WEAKNESS (GENERALIZED): ICD-10-CM

## 2022-12-06 DIAGNOSIS — M54.2 NECK PAIN: Primary | ICD-10-CM

## 2022-12-06 DIAGNOSIS — R29.898 DECREASED RANGE OF MOTION OF NECK: ICD-10-CM

## 2022-12-06 DIAGNOSIS — S46.819D STRAIN OF TRAPEZIUS MUSCLE, UNSPECIFIED LATERALITY, SUBSEQUENT ENCOUNTER: ICD-10-CM

## 2022-12-06 DIAGNOSIS — M25.521 PAIN IN JOINT INVOLVING UPPER ARM, RIGHT: ICD-10-CM

## 2022-12-06 PROCEDURE — 97012 MECHANICAL TRACTION THERAPY: CPT | Mod: GP | Performed by: PHYSICAL THERAPIST

## 2022-12-06 PROCEDURE — 97110 THERAPEUTIC EXERCISES: CPT | Mod: GP | Performed by: PHYSICAL THERAPIST

## 2022-12-20 ENCOUNTER — HOSPITAL ENCOUNTER (OUTPATIENT)
Dept: PHYSICAL THERAPY | Facility: REHABILITATION | Age: 37
Discharge: HOME | End: 2022-12-20
Payer: COMMERCIAL

## 2022-12-20 DIAGNOSIS — M25.521 PAIN IN JOINT INVOLVING UPPER ARM, RIGHT: ICD-10-CM

## 2022-12-20 DIAGNOSIS — S46.819D STRAIN OF TRAPEZIUS MUSCLE, UNSPECIFIED LATERALITY, SUBSEQUENT ENCOUNTER: ICD-10-CM

## 2022-12-20 DIAGNOSIS — M62.81 MUSCLE WEAKNESS (GENERALIZED): ICD-10-CM

## 2022-12-20 DIAGNOSIS — M54.2 NECK PAIN: Primary | ICD-10-CM

## 2022-12-20 DIAGNOSIS — R29.898 DECREASED RANGE OF MOTION OF NECK: ICD-10-CM

## 2022-12-20 PROCEDURE — 97110 THERAPEUTIC EXERCISES: CPT | Mod: GP | Performed by: PHYSICAL THERAPIST

## 2022-12-20 PROCEDURE — 97012 MECHANICAL TRACTION THERAPY: CPT | Mod: GP | Performed by: PHYSICAL THERAPIST

## 2022-12-20 NOTE — ADDENDUM NOTE
Encounter addended by: Gissell Ruggiero PT on: 12/20/2022 12:38 PM   Actions taken: Episode resolved

## 2022-12-20 NOTE — PROGRESS NOTES
Discharge Note       12/20/22 1200   Signing Clinician's Name / Credentials   Signing clinician's name / credentials Gissellsusie Wellsgilberto PT, DPT, OCS, CLT   Session Number   Session Number 10/12   Progress Note/Recertification   Recertification Due Date 01/01/23   Adult Goals   PT Ortho Eval Goals 1;2;3   Ortho Goal 1   Goal Description Pt will be able to perform full shift for work without increase in pain in neck or numbness in arms for improved quality of work in 90 days.   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   Goal Progress IMPROVING - pt is mostly only feeling increased R shoulder anterior pain with heavier lifting   Target Date 01/01/23       Comments No  present and pt declined using an  today - pt demo'ed understanding of all that was communicated   Subjective Report   Subjective Report Pt reports slight increase in R anterior shoulder and thumb pain over past 2 days - maybe due to weather. Otherwise had been feeling pretty good with mild R anterior shoulder pain remaining.   Objective Measures   Objective Measures Objective Measure 1;Objective Measure 2;Objective Measure 3;Objective Measure 4   Objective Measure 1   Objective Measure ROM   Details R shoulder ROM WNL with tightness anterior shoulder with end range ER and IR, WNL without pain flexion and abduction. R thumb WNL AROM without pain (was painful in abd and flexion at 1st MPT jt. as well)   Objective Measure 2   Objective Measure Strength - from last visit   Details R thumb opposition 4+/5, thumb ext and abd 4+/5 without pain at 1st MTP jt but tiring (was 4/5 and painful) - pt continues to struggle to be able to extend 1st MTP to straight with load of hair  binding   Objective Measure 3   Objective Measure Flexibility   Details WFL R shoulder girdle except min tightness remaining R anterior shoulder capsule and pecs   Objective Measure 4   Objective Measure Observation   Details Mechanical traction continues to decrease R anterior shoulder pain - positive distraction test for R shoulder sx resolution   Modalities   Modalities Mechanical Traction   Mechanical Traction   Traction, Mechanical (97008) 15   Skilled Intervention Performed supine mechanical traction with home Garibay unit at 20 lbs pressure x12' static with set up and cool down.   Patient Response Pt has continued relief of anterior R shoulder pain with static traction   Treatment Interventions   Interventions Therapeutic Procedure/Exercise;Neuromuscular Re-education;Manual Therapy;Self Care/Home Management   Therapeutic Procedure/exercise   Therapeutic Procedures: strength, endurance, ROM, flexibillity minutes (60385) 8   Skilled Intervention Reassessed response to HEP and activity level over past two weeks.  Reassessed R shoulder and thumb ROM and discussed progress. Pt to continue with R anterior shoulder stretching - emphasized stretching all directions and door way 90 90 stretch, shoulder blade squeeze at work and laying down after work to stretch arms and neck out.   Education   Learner Patient   Readiness Eager   Method Booklet/handout   Response Verbalizes Understanding   Plan   Home program HEP - cervical ROM, resistance band rows, goal post scap retraction stretch, chin tuck, median nerve glide, pec minor stretch in doorway, thumb extension and flexion with rubber binder   Plan for next session Discharge to  with HEP with goals met or improving and pt feeling comfortable and appropriate with her HEP. THank you for this referral!   Comments   Comments Pt demo's improved R hand strength with improved function with R thumb, improved R shoulder ROM with minimal tightness remaining R anterior shoulder  with decreasing pain intensity. Pt continues to have difficulty with lifting with pain R UE but feels like it is getting less sensitive with continuing with stretching with home program.   Total Session Time   Timed Code Treatment Minutes 23   Total Treatment Time (sum of timed and untimed services) 23   Medicare Claim Information   Medical Diagnosis Neck pain (M54.2)     Strain of trapezius muscle, unspecified laterality, subsequent encounter (S46.819D)     Pain in joint involving upper arm, right (M25.521)   PT Diagnosis Neck and B UE pain with numbness and weakness   Start of Care Date 07/07/22   Onset date of current episode/exacerbation 01/07/22   Certification date from 07/07/22

## 2022-12-20 NOTE — ADDENDUM NOTE
Encounter addended by: Gissell Ruggiero, PT on: 12/20/2022 12:38 PM   Actions taken: Clinical Note Signed, Flowsheet accepted

## 2023-05-08 ENCOUNTER — PATIENT OUTREACH (OUTPATIENT)
Dept: CARE COORDINATION | Facility: CLINIC | Age: 38
End: 2023-05-08
Payer: COMMERCIAL

## 2023-05-22 ENCOUNTER — PATIENT OUTREACH (OUTPATIENT)
Dept: CARE COORDINATION | Facility: CLINIC | Age: 38
End: 2023-05-22
Payer: COMMERCIAL